# Patient Record
Sex: MALE | Race: ASIAN | NOT HISPANIC OR LATINO | Employment: FULL TIME | URBAN - METROPOLITAN AREA
[De-identification: names, ages, dates, MRNs, and addresses within clinical notes are randomized per-mention and may not be internally consistent; named-entity substitution may affect disease eponyms.]

---

## 2017-03-28 DIAGNOSIS — E11.9 TYPE 2 DIABETES MELLITUS WITHOUT COMPLICATION, WITHOUT LONG-TERM CURRENT USE OF INSULIN (HCC): ICD-10-CM

## 2017-03-28 DIAGNOSIS — E78.00 ELEVATED CHOLESTEROL: Primary | ICD-10-CM

## 2017-04-03 LAB
ALBUMIN SERPL-MCNC: 4.7 G/DL (ref 3.5–5.2)
ALBUMIN/GLOB SERPL: 1.9 G/DL
ALP SERPL-CCNC: 54 U/L (ref 39–117)
ALT SERPL-CCNC: 18 U/L (ref 1–41)
AST SERPL-CCNC: 19 U/L (ref 1–40)
BILIRUB SERPL-MCNC: 1 MG/DL (ref 0.1–1.2)
BUN SERPL-MCNC: 24 MG/DL (ref 6–20)
BUN/CREAT SERPL: 22.6 (ref 7–25)
CALCIUM SERPL-MCNC: 9.3 MG/DL (ref 8.6–10.5)
CHLORIDE SERPL-SCNC: 103 MMOL/L (ref 98–107)
CHOLEST SERPL-MCNC: 230 MG/DL (ref 0–200)
CO2 SERPL-SCNC: 28.8 MMOL/L (ref 22–29)
CREAT SERPL-MCNC: 1.06 MG/DL (ref 0.76–1.27)
GLOBULIN SER CALC-MCNC: 2.5 GM/DL
GLUCOSE SERPL-MCNC: 119 MG/DL (ref 65–99)
HBA1C MFR BLD: 6.1 % (ref 4.8–5.6)
HDLC SERPL-MCNC: 47 MG/DL (ref 40–60)
LDLC SERPL CALC-MCNC: 161 MG/DL (ref 0–100)
LDLC/HDLC SERPL: 3.43 {RATIO}
POTASSIUM SERPL-SCNC: 3.9 MMOL/L (ref 3.5–5.2)
PROT SERPL-MCNC: 7.2 G/DL (ref 6–8.5)
SODIUM SERPL-SCNC: 146 MMOL/L (ref 136–145)
TRIGL SERPL-MCNC: 109 MG/DL (ref 0–150)
VLDLC SERPL CALC-MCNC: 21.8 MG/DL (ref 5–40)

## 2017-04-10 ENCOUNTER — OFFICE VISIT (OUTPATIENT)
Dept: FAMILY MEDICINE CLINIC | Facility: CLINIC | Age: 52
End: 2017-04-10

## 2017-04-10 VITALS
RESPIRATION RATE: 16 BRPM | HEIGHT: 68 IN | SYSTOLIC BLOOD PRESSURE: 118 MMHG | HEART RATE: 57 BPM | WEIGHT: 149 LBS | BODY MASS INDEX: 22.58 KG/M2 | DIASTOLIC BLOOD PRESSURE: 68 MMHG | TEMPERATURE: 98.2 F | OXYGEN SATURATION: 99 %

## 2017-04-10 DIAGNOSIS — E78.00 ELEVATED CHOLESTEROL: Primary | ICD-10-CM

## 2017-04-10 DIAGNOSIS — E11.9 DIABETES MELLITUS TYPE 2 IN NONOBESE (HCC): ICD-10-CM

## 2017-04-10 PROCEDURE — 99214 OFFICE O/P EST MOD 30 MIN: CPT | Performed by: INTERNAL MEDICINE

## 2017-04-10 RX ORDER — ATORVASTATIN CALCIUM 20 MG/1
20 TABLET, FILM COATED ORAL DAILY
Qty: 90 TABLET | Refills: 3 | Status: SHIPPED | OUTPATIENT
Start: 2017-04-10 | End: 2017-09-28 | Stop reason: SDUPTHER

## 2017-04-10 NOTE — PROGRESS NOTES
Subjective   Stanislav Martino is a 51 y.o. male. Patient is here today for   Chief Complaint   Patient presents with   • Diabetes     lab f/u   • Hyperlipidemia          Vitals:    04/10/17 1316   BP: 118/68   Pulse: 57   Resp: 16   Temp: 98.2 °F (36.8 °C)   SpO2: 99%       Past Medical History:   Diagnosis Date   • Diabetes mellitus    • GERD (gastroesophageal reflux disease)    • Hematochezia    • Hypercholesterolemia       No Known Allergies   Social History     Social History   • Marital status:      Spouse name: N/A   • Number of children: N/A   • Years of education: N/A     Occupational History   • Not on file.     Social History Main Topics   • Smoking status: Never Smoker   • Smokeless tobacco: Not on file   • Alcohol use Yes      Comment: occasional    • Drug use: Not on file   • Sexual activity: Not on file     Other Topics Concern   • Not on file     Social History Narrative        Current Outpatient Prescriptions:   •  ACCU-CHEK SOFTCLIX LANCETS lancets, Use as instructed, Disp: 100 each, Rfl: 12  •  Blood Glucose Monitoring Suppl (ACCU-CHEK VERONICA PLUS) W/DEVICE kit, USE AS DIRECTED, Disp: 1 kit, Rfl: 0  •  fluticasone (FLONASE) 50 MCG/ACT nasal spray, into each nostril daily., Disp: , Rfl:   •  glucose blood (ACCU-CHEK VERONICA PLUS) test strip, CHECK BLOOD SUGAR ONCE DAILY, Disp: 100 each, Rfl: 2  •  Lancets (ACCU-CHEK MULTICLIX) lancets, Check glucose once daily, Disp: 102 each, Rfl: 12  •  atorvastatin (LIPITOR) 20 MG tablet, Take 1 tablet by mouth Daily., Disp: 90 tablet, Rfl: 3     Objective     HPI Comments: He is here today to follow-up on his type 2 diabetes and hypercholesterolemia.    He claims to feel well.    Diabetes     Hyperlipidemia          Review of Systems   Constitutional: Negative.    HENT: Negative.    Respiratory: Negative.    Cardiovascular: Negative.    Psychiatric/Behavioral: Negative.        Physical Exam   Constitutional: He is oriented to person, place, and time. He  appears well-developed and well-nourished.   HENT:   Head: Normocephalic and atraumatic.   Pulmonary/Chest: Effort normal.   Neurological: He is alert and oriented to person, place, and time.   Psychiatric: He has a normal mood and affect. His behavior is normal.   Nursing note and vitals reviewed.        Problem List Items Addressed This Visit        Cardiovascular and Mediastinum    Elevated cholesterol - Primary    Relevant Medications    atorvastatin (LIPITOR) 20 MG tablet       Endocrine    Diabetes mellitus type 2 in nonobese            PLAN  He has excellent control of his type 2 diabetes.  This control with diet alone.    His hypercholesterolemia.  I asked him to consider starting medication to manage his hypercholesterolemia and he agreed.  I sent out a prescription for atorvastatin 20 mg once daily.    I like him back in about 3 months to reassess a lipid profile, comprehensive metabolic panel, urine microalbumin, and hemoglobin A1c.      No Follow-up on file.

## 2017-09-28 RX ORDER — ATORVASTATIN CALCIUM 20 MG/1
20 TABLET, FILM COATED ORAL DAILY
Qty: 90 TABLET | Refills: 3 | Status: SHIPPED | OUTPATIENT
Start: 2017-09-28

## 2017-10-26 DIAGNOSIS — E11.9 DIABETES MELLITUS TYPE 2 IN NONOBESE (HCC): ICD-10-CM

## 2017-10-26 DIAGNOSIS — E78.00 ELEVATED CHOLESTEROL: Primary | ICD-10-CM

## 2017-10-28 LAB
ALBUMIN SERPL-MCNC: 4.4 G/DL (ref 3.5–5.2)
ALBUMIN/CREAT UR: 2 MG/G CREAT (ref 0–30)
ALBUMIN/GLOB SERPL: 1.6 G/DL
ALP SERPL-CCNC: 46 U/L (ref 39–117)
ALT SERPL-CCNC: 15 U/L (ref 1–41)
AST SERPL-CCNC: 20 U/L (ref 1–40)
BILIRUB SERPL-MCNC: 0.5 MG/DL (ref 0.1–1.2)
BUN SERPL-MCNC: 20 MG/DL (ref 6–20)
BUN/CREAT SERPL: 20.2 (ref 7–25)
CALCIUM SERPL-MCNC: 9.3 MG/DL (ref 8.6–10.5)
CHLORIDE SERPL-SCNC: 103 MMOL/L (ref 98–107)
CHOLEST SERPL-MCNC: 193 MG/DL (ref 0–200)
CO2 SERPL-SCNC: 28.3 MMOL/L (ref 22–29)
CREAT SERPL-MCNC: 0.99 MG/DL (ref 0.76–1.27)
CREAT UR-MCNC: 210.4 MG/DL
GFR SERPLBLD CREATININE-BSD FMLA CKD-EPI: 79 ML/MIN/1.73
GFR SERPLBLD CREATININE-BSD FMLA CKD-EPI: 96 ML/MIN/1.73
GLOBULIN SER CALC-MCNC: 2.7 GM/DL
GLUCOSE SERPL-MCNC: 118 MG/DL (ref 65–99)
HBA1C MFR BLD: 5.7 % (ref 4.8–5.6)
HDLC SERPL-MCNC: 49 MG/DL (ref 40–60)
LDLC SERPL CALC-MCNC: 127 MG/DL (ref 0–100)
LDLC/HDLC SERPL: 2.59 {RATIO}
MICROALBUMIN UR-MCNC: 4.3 UG/ML
POTASSIUM SERPL-SCNC: 3.9 MMOL/L (ref 3.5–5.2)
PROT SERPL-MCNC: 7.1 G/DL (ref 6–8.5)
SODIUM SERPL-SCNC: 143 MMOL/L (ref 136–145)
TRIGL SERPL-MCNC: 85 MG/DL (ref 0–150)
VLDLC SERPL CALC-MCNC: 17 MG/DL (ref 5–40)

## 2017-11-03 ENCOUNTER — HOSPITAL ENCOUNTER (OUTPATIENT)
Dept: GENERAL RADIOLOGY | Facility: HOSPITAL | Age: 52
Discharge: HOME | End: 2017-11-03

## 2017-11-03 ENCOUNTER — OFFICE VISIT (OUTPATIENT)
Dept: FAMILY MEDICINE CLINIC | Facility: CLINIC | Age: 52
End: 2017-11-03

## 2017-11-03 ENCOUNTER — HOSPITAL ENCOUNTER (OUTPATIENT)
Dept: GENERAL RADIOLOGY | Facility: HOSPITAL | Age: 52
Discharge: HOME | End: 2017-11-03
Admitting: INTERNAL MEDICINE

## 2017-11-03 VITALS
SYSTOLIC BLOOD PRESSURE: 116 MMHG | HEART RATE: 70 BPM | DIASTOLIC BLOOD PRESSURE: 68 MMHG | WEIGHT: 144 LBS | OXYGEN SATURATION: 98 % | BODY MASS INDEX: 21.82 KG/M2 | HEIGHT: 68 IN | RESPIRATION RATE: 16 BRPM

## 2017-11-03 DIAGNOSIS — M25.562 LEFT KNEE PAIN, UNSPECIFIED CHRONICITY: Primary | ICD-10-CM

## 2017-11-03 DIAGNOSIS — M54.5 LOW BACK PAIN, UNSPECIFIED BACK PAIN LATERALITY, UNSPECIFIED CHRONICITY, WITH SCIATICA PRESENCE UNSPECIFIED: ICD-10-CM

## 2017-11-03 DIAGNOSIS — E11.9 DIABETES MELLITUS TYPE 2 IN NONOBESE (HCC): ICD-10-CM

## 2017-11-03 DIAGNOSIS — E78.00 ELEVATED CHOLESTEROL: ICD-10-CM

## 2017-11-03 LAB
BILIRUB BLD-MCNC: NEGATIVE MG/DL
CLARITY, POC: CLEAR
COLOR UR: YELLOW
GLUCOSE UR STRIP-MCNC: NEGATIVE MG/DL
KETONES UR QL: NEGATIVE
LEUKOCYTE EST, POC: NEGATIVE
NITRITE UR-MCNC: NEGATIVE MG/ML
PH UR: 7 [PH] (ref 5–8)
PROT UR STRIP-MCNC: NEGATIVE MG/DL
RBC # UR STRIP: NEGATIVE /UL
SP GR UR: 1.01 (ref 1–1.03)
UROBILINOGEN UR QL: NORMAL

## 2017-11-03 PROCEDURE — 72110 X-RAY EXAM L-2 SPINE 4/>VWS: CPT

## 2017-11-03 PROCEDURE — 81003 URINALYSIS AUTO W/O SCOPE: CPT | Performed by: INTERNAL MEDICINE

## 2017-11-03 PROCEDURE — 73560 X-RAY EXAM OF KNEE 1 OR 2: CPT

## 2017-11-03 PROCEDURE — 99214 OFFICE O/P EST MOD 30 MIN: CPT | Performed by: INTERNAL MEDICINE

## 2017-11-05 PROBLEM — M25.562 LEFT KNEE PAIN: Status: ACTIVE | Noted: 2017-11-05

## 2017-11-05 PROBLEM — M54.50 LOW BACK PAIN: Status: ACTIVE | Noted: 2017-11-05

## 2017-11-05 NOTE — PROGRESS NOTES
Subjective   Stanislav Martino is a 52 y.o. male. Patient is here today for   Chief Complaint   Patient presents with   • Hyperlipidemia     lab f/u   • Diabetes          Vitals:    11/03/17 1257   BP: 116/68   Pulse: 70   Resp: 16   SpO2: 98%       Past Medical History:   Diagnosis Date   • Diabetes mellitus    • GERD (gastroesophageal reflux disease)    • Hematochezia    • Hypercholesterolemia       No Known Allergies   Social History     Social History   • Marital status:      Spouse name: N/A   • Number of children: N/A   • Years of education: N/A     Occupational History   • Not on file.     Social History Main Topics   • Smoking status: Never Smoker   • Smokeless tobacco: Not on file   • Alcohol use Yes      Comment: occasional    • Drug use: Not on file   • Sexual activity: Not on file     Other Topics Concern   • Not on file     Social History Narrative        Current Outpatient Prescriptions:   •  ACCU-CHEK SOFTCLIX LANCETS lancets, Use as instructed, Disp: 100 each, Rfl: 12  •  atorvastatin (LIPITOR) 20 MG tablet, Take 1 tablet by mouth Daily., Disp: 90 tablet, Rfl: 3  •  Blood Glucose Monitoring Suppl (ACCU-CHEK VERONICA PLUS) W/DEVICE kit, USE AS DIRECTED, Disp: 1 kit, Rfl: 0  •  fluticasone (FLONASE) 50 MCG/ACT nasal spray, into each nostril daily., Disp: , Rfl:   •  glucose blood (ACCU-CHEK VERONICA PLUS) test strip, CHECK BLOOD SUGAR ONCE DAILY, Disp: 100 each, Rfl: 2  •  Lancets (ACCU-CHEK MULTICLIX) lancets, Check glucose once daily, Disp: 102 each, Rfl: 12     Objective     HPI Comments: He is here to follow-up on his type 2 diabetes and his hypercholesterolemia.    He complains of lumbar spine pain and right knee pain for many months.  He exercises regularly.  He currently enjoys running.  He used to play soccer but not since he injured himself years ago.  He intends to take part in the mini marathon upcoming.  For the time being, he is accustomed to running as many as 8 miles  daily..    Hyperlipidemia     Diabetes          Review of Systems   Constitutional: Negative.    HENT: Negative.    Respiratory: Negative.    Cardiovascular: Negative.    Musculoskeletal:        Lumbar spine pain and right knee pain   Psychiatric/Behavioral: Negative.        Physical Exam   Constitutional: He is oriented to person, place, and time. He appears well-developed and well-nourished.   Pleasant, neatly groomed, appropriate weight.   HENT:   Head: Normocephalic and atraumatic.   Pulmonary/Chest: Effort normal.   Musculoskeletal:   Head no crepitus in his right knee.  There was no effusion in the right knee.     He had no pain on palpation of his lumbar spine.   Neurological: He is alert and oriented to person, place, and time.   Psychiatric: He has a normal mood and affect. His behavior is normal.   Nursing note and vitals reviewed.        Problem List Items Addressed This Visit        Cardiovascular and Mediastinum    Elevated cholesterol       Endocrine    Diabetes mellitus type 2 in nonobese       Nervous and Auditory    Low back pain    Relevant Orders    XR Spine Lumbar 4+ View (Completed)    POC Urinalysis Dipstick, Automated (Completed)       Musculoskeletal and Integument    Left knee pain - Primary    Relevant Orders    XR Knee 1 or 2 View Left (Completed)            PLAN  He has excellent control of his type 2 diabetes.    His hypercholesterolemia is well-controlled.    He has chronic lumbar spine pain and right knee pain.  I'm going to get an x-ray of both these areas.  He may take an anti-inflammatory over-the-counter as needed.    He is relatively physically fit.  I don't think that physical therapy is going to have much to offer him in the way of pain relief.    It may be prudent to refer him to orthopedic surgery for further evaluation of his lumbar spine and right knee pain.  I will wait for the results of his x-rays.  No Follow-up on file.

## 2017-11-30 ENCOUNTER — TELEPHONE (OUTPATIENT)
Dept: FAMILY MEDICINE CLINIC | Facility: CLINIC | Age: 52
End: 2017-11-30

## 2017-11-30 RX ORDER — BLOOD-GLUCOSE METER
EACH MISCELLANEOUS
Qty: 1 KIT | Refills: 0 | Status: SHIPPED | OUTPATIENT
Start: 2017-11-30 | End: 2019-05-16 | Stop reason: SDUPTHER

## 2017-11-30 RX ORDER — MELOXICAM 15 MG/1
15 TABLET ORAL DAILY
Qty: 30 TABLET | Refills: 1 | Status: SHIPPED | OUTPATIENT
Start: 2017-11-30 | End: 2018-02-12

## 2017-11-30 NOTE — TELEPHONE ENCOUNTER
Spoke to patient and informed him that the prescription and the meter were sent to his pharmacy   ----- Message from Riri Bae sent at 11/30/2017 12:14 PM EST -----  PT CAME IN TODAY AND WANTED TO SEE IF HE COULD GET A NEW METER CALLED IN FOR HIM, THE ONE HE HAS IS OVER 2 YEARS OLD. (ACCU-CHECK)    REFILL ON MELOXICAM 15MG    TO [facility]    PT# [phone]

## 2018-02-12 ENCOUNTER — OFFICE VISIT (OUTPATIENT)
Dept: FAMILY MEDICINE CLINIC | Facility: CLINIC | Age: 53
End: 2018-02-12

## 2018-02-12 VITALS
DIASTOLIC BLOOD PRESSURE: 64 MMHG | RESPIRATION RATE: 18 BRPM | HEART RATE: 51 BPM | HEIGHT: 68 IN | SYSTOLIC BLOOD PRESSURE: 122 MMHG | BODY MASS INDEX: 22.43 KG/M2 | TEMPERATURE: 97.8 F | OXYGEN SATURATION: 97 % | WEIGHT: 148 LBS

## 2018-02-12 DIAGNOSIS — M54.5 LOW BACK PAIN, UNSPECIFIED BACK PAIN LATERALITY, UNSPECIFIED CHRONICITY, WITH SCIATICA PRESENCE UNSPECIFIED: ICD-10-CM

## 2018-02-12 DIAGNOSIS — R10.13 EPIGASTRIC PAIN: Primary | ICD-10-CM

## 2018-02-12 LAB
BILIRUB BLD-MCNC: NEGATIVE MG/DL
CLARITY, POC: CLEAR
COLOR UR: YELLOW
GLUCOSE UR STRIP-MCNC: NEGATIVE MG/DL
KETONES UR QL: NEGATIVE
LEUKOCYTE EST, POC: NEGATIVE
NITRITE UR-MCNC: NEGATIVE MG/ML
PH UR: 5.5 [PH] (ref 5–8)
PROT UR STRIP-MCNC: NEGATIVE MG/DL
RBC # UR STRIP: ABNORMAL /UL
SP GR UR: 1 (ref 1–1.03)
UROBILINOGEN UR QL: NORMAL

## 2018-02-12 PROCEDURE — 99213 OFFICE O/P EST LOW 20 MIN: CPT | Performed by: INTERNAL MEDICINE

## 2018-02-12 PROCEDURE — 81003 URINALYSIS AUTO W/O SCOPE: CPT | Performed by: INTERNAL MEDICINE

## 2018-02-12 RX ORDER — PANTOPRAZOLE SODIUM 40 MG/1
40 TABLET, DELAYED RELEASE ORAL DAILY
Qty: 30 TABLET | Refills: 0 | Status: SHIPPED | OUTPATIENT
Start: 2018-02-12 | End: 2018-09-13 | Stop reason: SDUPTHER

## 2018-02-12 NOTE — PROGRESS NOTES
Subjective   Stanislav Martino is a 52 y.o. male. Patient is here today for   Chief Complaint   Patient presents with   • Back Pain     and Burning x 4 weeks    • Abdominal Pain     RLQ pain x 2 weeks          Vitals:    02/12/18 1555   BP: 122/64   Pulse: 51   Resp: 18   Temp: 97.8 °F (36.6 °C)   SpO2: 97%     The following portions of the patient's history were reviewed and updated as appropriate: allergies, current medications, past family history, past medical history, past social history, past surgical history and problem list.    Past Medical History:   Diagnosis Date   • Diabetes mellitus    • GERD (gastroesophageal reflux disease)    • Hematochezia    • Hypercholesterolemia       No Known Allergies   Social History     Social History   • Marital status:      Spouse name: N/A   • Number of children: N/A   • Years of education: N/A     Occupational History   • Not on file.     Social History Main Topics   • Smoking status: Never Smoker   • Smokeless tobacco: Not on file   • Alcohol use Yes      Comment: occasional    • Drug use: Not on file   • Sexual activity: Not on file     Other Topics Concern   • Not on file     Social History Narrative        Current Outpatient Prescriptions:   •  atorvastatin (LIPITOR) 20 MG tablet, Take 1 tablet by mouth Daily., Disp: 90 tablet, Rfl: 3  •  Blood Glucose Monitoring Suppl (ACCU-CHEK VERONICA PLUS) w/Device kit, Use daily as instructed for type 2 diabetes E11.9, Disp: 1 kit, Rfl: 0  •  fluticasone (FLONASE) 50 MCG/ACT nasal spray, into each nostril daily., Disp: , Rfl:   •  glucose blood (ACCU-CHEK VERONICA PLUS) test strip, CHECK BLOOD SUGAR ONCE DAILY, Disp: 100 each, Rfl: 3  •  Lancets (ACCU-CHEK MULTICLIX) lancets, Check glucose once daily, Disp: 102 each, Rfl: 12  •  ACCU-CHEK SOFTCLIX LANCETS lancets, Use as instructed, Disp: 100 each, Rfl: 12  •  pantoprazole (PROTONIX) 40 MG EC tablet, Take 1 tablet by mouth Daily., Disp: 30 tablet, Rfl: 0     Objective     History  of Present Illness Stanislav complains of upper abdominal pain that started about a week to week and a half ago.  He describes the pain as burning.  He has not taken anything for the pain.  The pain does wake him up at night.  He denies any nausea, vomiting, change in his bowel movements, or melena.  He does not take any nonsteroidal anti-inflammatory agents.  He also complains of bilateral lower back pain that is intermittent.  He denies any urinary symptoms.    Review of Systems   Gastrointestinal: Positive for abdominal pain. Negative for blood in stool, diarrhea, nausea and vomiting.   Genitourinary: Negative.    Psychiatric/Behavioral: Negative.        Physical Exam   Constitutional: He appears well-developed and well-nourished.   Cardiovascular: Normal rate, regular rhythm and normal heart sounds.    Pulmonary/Chest: Effort normal and breath sounds normal.   Abdominal: Soft. Bowel sounds are normal. He exhibits no distension and no mass. There is no tenderness.   Vitals reviewed.      ASSESSMENT     Problem List Items Addressed This Visit        Nervous and Auditory    Low back pain    Epigastric pain - Primary    Relevant Orders    POC Urinalysis Dipstick, Automated (Completed)          PLAN  Patient Instructions   Items are most consistent with gastritis or possibly an ulcer.  Start pantoprazole 40 mg one half-hour before breakfast.  He can also take an antacid as needed.  If symptoms persist will do some lab work and obtain an upper GI.    Return if symptoms worsen or fail to improve.

## 2018-02-13 NOTE — PATIENT INSTRUCTIONS
Items are most consistent with gastritis or possibly an ulcer.  Start pantoprazole 40 mg one half-hour before breakfast.  He can also take an antacid as needed.  If symptoms persist will do some lab work and obtain an upper GI.

## 2018-09-13 RX ORDER — PANTOPRAZOLE SODIUM 40 MG/1
40 TABLET, DELAYED RELEASE ORAL DAILY
Qty: 30 TABLET | Refills: 0 | Status: SHIPPED | OUTPATIENT
Start: 2018-09-13 | End: 2019-09-12 | Stop reason: SDUPTHER

## 2018-10-12 DIAGNOSIS — E11.9 DIABETES MELLITUS TYPE 2 IN NONOBESE (HCC): ICD-10-CM

## 2018-10-12 DIAGNOSIS — Z11.59 NEED FOR HEPATITIS C SCREENING TEST: ICD-10-CM

## 2018-10-12 DIAGNOSIS — Z12.5 SCREENING PSA (PROSTATE SPECIFIC ANTIGEN): ICD-10-CM

## 2018-10-12 DIAGNOSIS — E78.00 ELEVATED CHOLESTEROL: Primary | ICD-10-CM

## 2018-10-26 LAB
ALBUMIN SERPL-MCNC: 4.5 G/DL (ref 3.5–5.2)
ALBUMIN/GLOB SERPL: 1.7 G/DL
ALP SERPL-CCNC: 50 U/L (ref 39–117)
ALT SERPL-CCNC: 23 U/L (ref 1–41)
APPEARANCE UR: CLEAR
AST SERPL-CCNC: 24 U/L (ref 1–40)
BACTERIA #/AREA URNS HPF: NORMAL /HPF
BASOPHILS # BLD AUTO: 0.02 10*3/MM3 (ref 0–0.2)
BASOPHILS NFR BLD AUTO: 0.5 % (ref 0–1.5)
BILIRUB SERPL-MCNC: 0.6 MG/DL (ref 0.1–1.2)
BILIRUB UR QL STRIP: NEGATIVE
BUN SERPL-MCNC: 25 MG/DL (ref 6–20)
BUN/CREAT SERPL: 25.3 (ref 7–25)
CALCIUM SERPL-MCNC: 9.6 MG/DL (ref 8.6–10.5)
CASTS URNS MICRO: NORMAL
CHLORIDE SERPL-SCNC: 101 MMOL/L (ref 98–107)
CHOLEST SERPL-MCNC: 141 MG/DL (ref 0–200)
CO2 SERPL-SCNC: 28.3 MMOL/L (ref 22–29)
COLOR UR: YELLOW
CREAT SERPL-MCNC: 0.99 MG/DL (ref 0.76–1.27)
EOSINOPHIL # BLD AUTO: 0.08 10*3/MM3 (ref 0–0.7)
EOSINOPHIL NFR BLD AUTO: 1.9 % (ref 0.3–6.2)
EPI CELLS #/AREA URNS HPF: NORMAL /HPF
ERYTHROCYTE [DISTWIDTH] IN BLOOD BY AUTOMATED COUNT: 12.7 % (ref 11.5–14.5)
GLOBULIN SER CALC-MCNC: 2.6 GM/DL
GLUCOSE SERPL-MCNC: 131 MG/DL (ref 65–99)
GLUCOSE UR QL: NEGATIVE
HBA1C MFR BLD: 5.73 % (ref 4.8–5.6)
HCT VFR BLD AUTO: 40 % (ref 40.4–52.2)
HCV AB S/CO SERPL IA: <0.1 S/CO RATIO (ref 0–0.9)
HCV AB SERPL QL IA: NORMAL
HDLC SERPL-MCNC: 42 MG/DL (ref 40–60)
HGB BLD-MCNC: 13.1 G/DL (ref 13.7–17.6)
HGB UR QL STRIP: NEGATIVE
IMM GRANULOCYTES # BLD: 0 10*3/MM3 (ref 0–0.03)
IMM GRANULOCYTES NFR BLD: 0 % (ref 0–0.5)
KETONES UR QL STRIP: NEGATIVE
LDLC SERPL CALC-MCNC: 72 MG/DL (ref 0–100)
LDLC/HDLC SERPL: 1.71 {RATIO}
LEUKOCYTE ESTERASE UR QL STRIP: NEGATIVE
LYMPHOCYTES # BLD AUTO: 1.46 10*3/MM3 (ref 0.9–4.8)
LYMPHOCYTES NFR BLD AUTO: 35.5 % (ref 19.6–45.3)
MCH RBC QN AUTO: 29 PG (ref 27–32.7)
MCHC RBC AUTO-ENTMCNC: 32.8 G/DL (ref 32.6–36.4)
MCV RBC AUTO: 88.7 FL (ref 79.8–96.2)
MONOCYTES # BLD AUTO: 0.26 10*3/MM3 (ref 0.2–1.2)
MONOCYTES NFR BLD AUTO: 6.3 % (ref 5–12)
NEUTROPHILS # BLD AUTO: 2.29 10*3/MM3 (ref 1.9–8.1)
NEUTROPHILS NFR BLD AUTO: 55.8 % (ref 42.7–76)
NITRITE UR QL STRIP: NEGATIVE
PH UR STRIP: 6 [PH] (ref 5–8)
PLATELET # BLD AUTO: 148 10*3/MM3 (ref 140–500)
POTASSIUM SERPL-SCNC: 3.9 MMOL/L (ref 3.5–5.2)
PROT SERPL-MCNC: 7.1 G/DL (ref 6–8.5)
PROT UR QL STRIP: NEGATIVE
PSA SERPL-MCNC: 0.92 NG/ML (ref 0–4)
RBC # BLD AUTO: 4.51 10*6/MM3 (ref 4.6–6)
RBC #/AREA URNS HPF: NORMAL /HPF
SODIUM SERPL-SCNC: 141 MMOL/L (ref 136–145)
SP GR UR: 1.02 (ref 1–1.03)
TRIGL SERPL-MCNC: 136 MG/DL (ref 0–150)
UROBILINOGEN UR STRIP-MCNC: (no result) MG/DL
VLDLC SERPL CALC-MCNC: 27.2 MG/DL (ref 5–40)
WBC # BLD AUTO: 4.11 10*3/MM3 (ref 4.5–10.7)
WBC #/AREA URNS HPF: NORMAL /HPF

## 2018-11-01 ENCOUNTER — OFFICE VISIT (OUTPATIENT)
Dept: FAMILY MEDICINE CLINIC | Facility: CLINIC | Age: 53
End: 2018-11-01

## 2018-11-01 VITALS
BODY MASS INDEX: 21.92 KG/M2 | SYSTOLIC BLOOD PRESSURE: 102 MMHG | DIASTOLIC BLOOD PRESSURE: 62 MMHG | HEART RATE: 66 BPM | HEIGHT: 68 IN | TEMPERATURE: 98.2 F | RESPIRATION RATE: 16 BRPM | WEIGHT: 144.6 LBS | OXYGEN SATURATION: 99 %

## 2018-11-01 DIAGNOSIS — E78.00 HYPERCHOLESTEROLEMIA: Primary | ICD-10-CM

## 2018-11-01 DIAGNOSIS — E11.9 DIABETES MELLITUS TYPE 2 IN NONOBESE (HCC): ICD-10-CM

## 2018-11-01 PROCEDURE — 99213 OFFICE O/P EST LOW 20 MIN: CPT | Performed by: INTERNAL MEDICINE

## 2018-11-04 NOTE — PROGRESS NOTES
Subjective   Stanislav Martino is a 53 y.o. male. Patient is here today for   Chief Complaint   Patient presents with   • Follow-up     ELEVATED CHOLESTEROL           Vitals:    11/01/18 1509   BP: 102/62   Pulse: 66   Resp: 16   Temp: 98.2 °F (36.8 °C)   SpO2: 99%       Past Medical History:   Diagnosis Date   • Diabetes mellitus (CMS/HCC)    • GERD (gastroesophageal reflux disease)    • Hematochezia    • Hypercholesterolemia       No Known Allergies   Social History     Social History   • Marital status:      Spouse name: N/A   • Number of children: N/A   • Years of education: N/A     Occupational History   • Not on file.     Social History Main Topics   • Smoking status: Never Smoker   • Smokeless tobacco: Never Used   • Alcohol use Yes      Comment: occasional    • Drug use: Unknown   • Sexual activity: Not on file     Other Topics Concern   • Not on file     Social History Narrative   • No narrative on file        Current Outpatient Prescriptions:   •  atorvastatin (LIPITOR) 20 MG tablet, Take 1 tablet by mouth Daily., Disp: 90 tablet, Rfl: 3  •  Blood Glucose Monitoring Suppl (ACCU-CHEK VERONICA PLUS) w/Device kit, Use daily as instructed for type 2 diabetes E11.9, Disp: 1 kit, Rfl: 0  •  fluticasone (FLONASE) 50 MCG/ACT nasal spray, into each nostril daily., Disp: , Rfl:   •  glucose blood (ACCU-CHEK VERONICA PLUS) test strip, CHECK BLOOD SUGAR ONCE DAILY, Disp: 100 each, Rfl: 3  •  Lancets (ACCU-CHEK MULTICLIX) lancets, Check glucose once daily, Disp: 102 each, Rfl: 12  •  pantoprazole (PROTONIX) 40 MG EC tablet, TAKE 1 TABLET BY MOUTH DAILY, Disp: 30 tablet, Rfl: 0  •  ACCU-CHEK SOFTCLIX LANCETS lancets, Use as instructed, Disp: 100 each, Rfl: 12     Objective     He is here today to follow-up with hypercholesterolemia and type 2 diabetes.    He tells me he feels well.  Next    He would like to make an appointment for preventative physical.             Review of Systems   Constitutional: Negative.    HENT:  Negative.    Respiratory: Negative.    Cardiovascular: Negative.    Musculoskeletal: Negative.    Psychiatric/Behavioral: Negative.        Physical Exam   Constitutional: He is oriented to person, place, and time. He appears well-developed and well-nourished.   HENT:   Head: Atraumatic.   Pulmonary/Chest: Effort normal.   Neurological: He is alert and oriented to person, place, and time.   Psychiatric: He has a normal mood and affect. His behavior is normal.   Nursing note and vitals reviewed.        Problem List Items Addressed This Visit        Cardiovascular and Mediastinum    Hypercholesterolemia - Primary       Endocrine    Diabetes mellitus type 2 in nonobese (CMS/HCC)            PLAN  His hypercholesterolemia is well-controlled.  Next    He has excellent control his type 2 diabetes.    I asked him to make an appointment for a yearly comprehensive physical exam/preventative exam at his convenience.    I like to see him back in 6 months or so.  No Follow-up on file.

## 2018-12-14 ENCOUNTER — OFFICE VISIT (OUTPATIENT)
Dept: FAMILY MEDICINE CLINIC | Facility: CLINIC | Age: 53
End: 2018-12-14

## 2018-12-14 VITALS
OXYGEN SATURATION: 98 % | RESPIRATION RATE: 18 BRPM | HEIGHT: 68 IN | DIASTOLIC BLOOD PRESSURE: 72 MMHG | SYSTOLIC BLOOD PRESSURE: 110 MMHG | HEART RATE: 66 BPM | WEIGHT: 146 LBS | BODY MASS INDEX: 22.13 KG/M2

## 2018-12-14 DIAGNOSIS — R30.0 DYSURIA: ICD-10-CM

## 2018-12-14 DIAGNOSIS — R10.13 EPIGASTRIC PAIN: Primary | ICD-10-CM

## 2018-12-14 LAB
BILIRUB BLD-MCNC: NEGATIVE MG/DL
GLUCOSE UR STRIP-MCNC: NEGATIVE MG/DL
KETONES UR QL: NEGATIVE
LEUKOCYTE EST, POC: NEGATIVE
NITRITE UR-MCNC: NEGATIVE MG/ML
PH UR: 6.5 [PH] (ref 5–8)
PROT UR STRIP-MCNC: NEGATIVE MG/DL
RBC # UR STRIP: NEGATIVE /UL
SP GR UR: 1.02 (ref 1–1.03)
UROBILINOGEN UR QL: NORMAL

## 2018-12-14 PROCEDURE — 81003 URINALYSIS AUTO W/O SCOPE: CPT | Performed by: INTERNAL MEDICINE

## 2018-12-14 PROCEDURE — 99214 OFFICE O/P EST MOD 30 MIN: CPT | Performed by: INTERNAL MEDICINE

## 2018-12-14 NOTE — PATIENT INSTRUCTIONS
Neck examination reveals normal submandibular glands and no abnormal lymph nodes.  Urinalysis today is normal.  Discussed referring to gastroenterology to be tested for H. pylori

## 2019-02-01 ENCOUNTER — OFFICE VISIT (OUTPATIENT)
Dept: FAMILY MEDICINE CLINIC | Facility: CLINIC | Age: 54
End: 2019-02-01

## 2019-02-01 VITALS
TEMPERATURE: 100.4 F | OXYGEN SATURATION: 99 % | WEIGHT: 141.2 LBS | SYSTOLIC BLOOD PRESSURE: 132 MMHG | RESPIRATION RATE: 16 BRPM | BODY MASS INDEX: 21.4 KG/M2 | HEART RATE: 81 BPM | DIASTOLIC BLOOD PRESSURE: 72 MMHG | HEIGHT: 68 IN

## 2019-02-01 DIAGNOSIS — R50.9 FEVER, UNSPECIFIED FEVER CAUSE: ICD-10-CM

## 2019-02-01 DIAGNOSIS — R52 GENERALIZED BODY ACHES: Primary | ICD-10-CM

## 2019-02-01 DIAGNOSIS — J10.1 INFLUENZA A: ICD-10-CM

## 2019-02-01 LAB
EXPIRATION DATE: ABNORMAL
FLUAV AG NPH QL: POSITIVE
FLUBV AG NPH QL: NEGATIVE
INTERNAL CONTROL: ABNORMAL
Lab: ABNORMAL

## 2019-02-01 PROCEDURE — 87804 INFLUENZA ASSAY W/OPTIC: CPT | Performed by: INTERNAL MEDICINE

## 2019-02-01 PROCEDURE — 99213 OFFICE O/P EST LOW 20 MIN: CPT | Performed by: INTERNAL MEDICINE

## 2019-02-01 RX ORDER — OSELTAMIVIR PHOSPHATE 75 MG/1
75 CAPSULE ORAL 2 TIMES DAILY
Qty: 10 CAPSULE | Refills: 0 | Status: SHIPPED | OUTPATIENT
Start: 2019-02-01 | End: 2019-05-16

## 2019-02-01 RX ORDER — OSELTAMIVIR PHOSPHATE 75 MG/1
75 CAPSULE ORAL 2 TIMES DAILY
Qty: 10 CAPSULE | Refills: 0 | Status: SHIPPED | OUTPATIENT
Start: 2019-02-01 | End: 2019-02-01

## 2019-02-01 RX ORDER — GUAIFENESIN AND CODEINE PHOSPHATE 100; 10 MG/5ML; MG/5ML
5 SOLUTION ORAL 4 TIMES DAILY PRN
Qty: 180 ML | Refills: 0 | Status: SHIPPED | OUTPATIENT
Start: 2019-02-01 | End: 2019-05-16

## 2019-02-01 RX ORDER — ONDANSETRON 8 MG/1
8 TABLET, ORALLY DISINTEGRATING ORAL EVERY 8 HOURS PRN
Qty: 30 TABLET | Refills: 1 | Status: SHIPPED | OUTPATIENT
Start: 2019-02-01 | End: 2019-05-16

## 2019-02-03 PROBLEM — J10.1 INFLUENZA A: Status: ACTIVE | Noted: 2019-02-03

## 2019-02-03 NOTE — PROGRESS NOTES
Subjective   Stanislav Martino is a 53 y.o. male. Patient is here today for   Chief Complaint   Patient presents with   • Fever     PT STATE HAS BEEN GOING ON SINCE wednesday    • Cough   • Generalized Body Aches   • Nausea          Vitals:    02/01/19 1350   BP: 132/72   Pulse: 81   Resp: 16   Temp: 100.4 °F (38 °C)   SpO2: 99%       Past Medical History:   Diagnosis Date   • Diabetes mellitus (CMS/HCC)    • GERD (gastroesophageal reflux disease)    • Hematochezia    • Hypercholesterolemia       No Known Allergies   Social History     Socioeconomic History   • Marital status:      Spouse name: Not on file   • Number of children: Not on file   • Years of education: Not on file   • Highest education level: Not on file   Social Needs   • Financial resource strain: Not on file   • Food insecurity - worry: Not on file   • Food insecurity - inability: Not on file   • Transportation needs - medical: Not on file   • Transportation needs - non-medical: Not on file   Occupational History   • Not on file   Tobacco Use   • Smoking status: Never Smoker   • Smokeless tobacco: Never Used   Substance and Sexual Activity   • Alcohol use: Yes     Comment: occasional    • Drug use: Not on file   • Sexual activity: Not on file   Other Topics Concern   • Not on file   Social History Narrative   • Not on file        Current Outpatient Medications:   •  atorvastatin (LIPITOR) 20 MG tablet, Take 1 tablet by mouth Daily., Disp: 90 tablet, Rfl: 3  •  Blood Glucose Monitoring Suppl (ACCU-CHEK VERONICA PLUS) w/Device kit, Use daily as instructed for type 2 diabetes E11.9, Disp: 1 kit, Rfl: 0  •  fluticasone (FLONASE) 50 MCG/ACT nasal spray, into each nostril daily., Disp: , Rfl:   •  glucose blood (ACCU-CHEK VERONICA PLUS) test strip, CHECK BLOOD SUGAR ONE TIME DAILY, Disp: 100 each, Rfl: 3  •  Lancets (ACCU-CHEK MULTICLIX) lancets, Check glucose once daily, Disp: 102 each, Rfl: 12  •  pantoprazole (PROTONIX) 40 MG EC tablet, TAKE 1 TABLET BY  MOUTH DAILY, Disp: 30 tablet, Rfl: 0  •  ACCU-CHEK SOFTCLIX LANCETS lancets, Use as instructed, Disp: 100 each, Rfl: 12  •  guaifenesin-codeine (GUAIFENESIN AC) 100-10 MG/5ML liquid, Take 5 mL by mouth 4 (Four) Times a Day As Needed for Cough., Disp: 180 mL, Rfl: 0  •  ondansetron ODT (ZOFRAN-ODT) 8 MG disintegrating tablet, Take 1 tablet by mouth Every 8 (Eight) Hours As Needed for Nausea or Vomiting., Disp: 30 tablet, Rfl: 1  •  oseltamivir (TAMIFLU) 75 MG capsule, Take 1 capsule by mouth 2 (Two) Times a Day., Disp: 10 capsule, Rfl: 0     Objective     He has had fevers chills and a cough last couple days.  He also has generalized body aches.         Review of Systems   Constitutional: Negative for chills and fever.   Respiratory: Positive for cough. Negative for shortness of breath.    Musculoskeletal: Positive for myalgias.       Physical Exam   Constitutional: He is oriented to person, place, and time. He appears well-developed and well-nourished.   Pleasant, neatly groomed, he looks like he feels ill.   HENT:   Head: Normocephalic and atraumatic.   Cardiovascular: Normal rate and regular rhythm.   Pulmonary/Chest: Effort normal and breath sounds normal.   Neurological: He is alert and oriented to person, place, and time.   Nursing note and vitals reviewed.        Problem List Items Addressed This Visit        Respiratory    Influenza A    Relevant Medications    oseltamivir (TAMIFLU) 75 MG capsule      Other Visit Diagnoses     Generalized body aches    -  Primary    Relevant Orders    POC Influenza A / B (Completed)    Fever, unspecified fever cause        Relevant Orders    POC Influenza A / B (Completed)            PLAN  I gave him a prescription for a cough medication to use when necessary.    I told him to drink plenty of fluids.  He should stay in bed and avoid people as long as he has a fever.  No Follow-up on file.    This patient influenza A screen was positive.

## 2019-04-26 DIAGNOSIS — E11.9 DIABETES MELLITUS TYPE 2 IN NONOBESE (HCC): ICD-10-CM

## 2019-04-26 DIAGNOSIS — Z12.5 SCREENING PSA (PROSTATE SPECIFIC ANTIGEN): ICD-10-CM

## 2019-04-26 DIAGNOSIS — Z00.00 ROUTINE HEALTH MAINTENANCE: Primary | ICD-10-CM

## 2019-05-07 ENCOUNTER — CLINICAL SUPPORT (OUTPATIENT)
Dept: FAMILY MEDICINE CLINIC | Facility: CLINIC | Age: 54
End: 2019-05-07

## 2019-05-07 DIAGNOSIS — Z00.00 ROUTINE HEALTH MAINTENANCE: Primary | ICD-10-CM

## 2019-05-07 PROCEDURE — 93000 ELECTROCARDIOGRAM COMPLETE: CPT | Performed by: INTERNAL MEDICINE

## 2019-05-07 PROCEDURE — 71046 X-RAY EXAM CHEST 2 VIEWS: CPT | Performed by: INTERNAL MEDICINE

## 2019-05-08 ENCOUNTER — CLINICAL SUPPORT (OUTPATIENT)
Dept: FAMILY MEDICINE CLINIC | Facility: CLINIC | Age: 54
End: 2019-05-08

## 2019-05-08 DIAGNOSIS — Z00.00 ROUTINE HEALTH MAINTENANCE: Primary | ICD-10-CM

## 2019-05-08 PROCEDURE — 85025 COMPLETE CBC W/AUTO DIFF WBC: CPT | Performed by: INTERNAL MEDICINE

## 2019-05-09 LAB
ALBUMIN SERPL-MCNC: 4.3 G/DL (ref 3.5–5.2)
ALBUMIN/CREAT UR: <2.4 MG/G CREAT (ref 0–30)
ALBUMIN/GLOB SERPL: 1.5 G/DL
ALP SERPL-CCNC: 48 U/L (ref 39–117)
ALT SERPL-CCNC: 21 U/L (ref 1–41)
APPEARANCE UR: CLEAR
AST SERPL-CCNC: 17 U/L (ref 1–40)
BILIRUB SERPL-MCNC: 0.6 MG/DL (ref 0.2–1.2)
BILIRUB UR QL STRIP: NEGATIVE
BUN SERPL-MCNC: 21 MG/DL (ref 6–20)
BUN/CREAT SERPL: 22.8 (ref 7–25)
CALCIUM SERPL-MCNC: 9.6 MG/DL (ref 8.6–10.5)
CHLORIDE SERPL-SCNC: 104 MMOL/L (ref 98–107)
CHOLEST SERPL-MCNC: 190 MG/DL (ref 0–200)
CO2 SERPL-SCNC: 29 MMOL/L (ref 22–29)
COLOR UR: YELLOW
CREAT SERPL-MCNC: 0.92 MG/DL (ref 0.76–1.27)
CREAT UR-MCNC: 124.9 MG/DL
GLOBULIN SER CALC-MCNC: 2.9 GM/DL
GLUCOSE SERPL-MCNC: 140 MG/DL (ref 65–99)
GLUCOSE UR QL: NEGATIVE
HBA1C MFR BLD: 6.3 % (ref 4.8–5.6)
HDLC SERPL-MCNC: 50 MG/DL (ref 40–60)
HGB UR QL STRIP: NEGATIVE
KETONES UR QL STRIP: NEGATIVE
LDLC SERPL CALC-MCNC: 121 MG/DL (ref 0–100)
LDLC/HDLC SERPL: 2.43 {RATIO}
LEUKOCYTE ESTERASE UR QL STRIP: NEGATIVE
MICROALBUMIN UR-MCNC: <3 UG/ML
NITRITE UR QL STRIP: NEGATIVE
PH UR STRIP: 5.5 [PH] (ref 5–8)
POTASSIUM SERPL-SCNC: 4.1 MMOL/L (ref 3.5–5.2)
PROT SERPL-MCNC: 7.2 G/DL (ref 6–8.5)
PROT UR QL STRIP: NEGATIVE
PSA SERPL-MCNC: 1.03 NG/ML (ref 0–4)
SODIUM SERPL-SCNC: 142 MMOL/L (ref 136–145)
SP GR UR: 1.02 (ref 1–1.03)
TRIGL SERPL-MCNC: 93 MG/DL (ref 0–150)
UROBILINOGEN UR STRIP-MCNC: NORMAL MG/DL
VLDLC SERPL CALC-MCNC: 18.6 MG/DL

## 2019-05-16 ENCOUNTER — OFFICE VISIT (OUTPATIENT)
Dept: FAMILY MEDICINE CLINIC | Facility: CLINIC | Age: 54
End: 2019-05-16

## 2019-05-16 VITALS
SYSTOLIC BLOOD PRESSURE: 106 MMHG | OXYGEN SATURATION: 98 % | TEMPERATURE: 98 F | HEART RATE: 62 BPM | DIASTOLIC BLOOD PRESSURE: 64 MMHG | RESPIRATION RATE: 16 BRPM | BODY MASS INDEX: 22.37 KG/M2 | WEIGHT: 147.6 LBS | HEIGHT: 68 IN

## 2019-05-16 DIAGNOSIS — Z00.00 WELL ADULT EXAM: ICD-10-CM

## 2019-05-16 DIAGNOSIS — Z12.11 SCREEN FOR COLON CANCER: Primary | ICD-10-CM

## 2019-05-16 DIAGNOSIS — M25.562 LEFT KNEE PAIN, UNSPECIFIED CHRONICITY: ICD-10-CM

## 2019-05-16 DIAGNOSIS — Z00.00 ANNUAL PHYSICAL EXAM: ICD-10-CM

## 2019-05-16 DIAGNOSIS — E78.00 HYPERCHOLESTEROLEMIA: ICD-10-CM

## 2019-05-16 DIAGNOSIS — Z12.12 SCREENING FOR RECTAL CANCER: ICD-10-CM

## 2019-05-16 DIAGNOSIS — E11.9 DIABETES MELLITUS TYPE 2 IN NONOBESE (HCC): ICD-10-CM

## 2019-05-16 PROCEDURE — 73560 X-RAY EXAM OF KNEE 1 OR 2: CPT | Performed by: INTERNAL MEDICINE

## 2019-05-16 PROCEDURE — 71046 X-RAY EXAM CHEST 2 VIEWS: CPT | Performed by: INTERNAL MEDICINE

## 2019-05-16 PROCEDURE — 99396 PREV VISIT EST AGE 40-64: CPT | Performed by: INTERNAL MEDICINE

## 2019-05-16 RX ORDER — BLOOD-GLUCOSE METER
EACH MISCELLANEOUS
Qty: 1 KIT | Refills: 0 | Status: SHIPPED | OUTPATIENT
Start: 2019-05-16 | End: 2019-09-12 | Stop reason: SDUPTHER

## 2019-05-16 NOTE — PROGRESS NOTES
Subjective   Stanislav Martino is a 53 y.o. male. Patient is here today for   Chief Complaint   Patient presents with   • Annual Exam     PT HERE FOR CPE          Vitals:    05/16/19 1446   BP: 106/64   Pulse: 62   Resp: 16   Temp: 98 °F (36.7 °C)   SpO2: 98%       The following portions of the patient's history were reviewed and updated as appropriate: allergies, current medications, past family history, past medical history, past social history, past surgical history and problem list.    Past Medical History:   Diagnosis Date   • Diabetes mellitus (CMS/HCC)    • GERD (gastroesophageal reflux disease)    • Hematochezia    • Hypercholesterolemia       No Known Allergies   Social History     Socioeconomic History   • Marital status:      Spouse name: Not on file   • Number of children: Not on file   • Years of education: Not on file   • Highest education level: Not on file   Tobacco Use   • Smoking status: Never Smoker   • Smokeless tobacco: Never Used   Substance and Sexual Activity   • Alcohol use: Yes     Comment: occasional         Current Outpatient Medications:   •  atorvastatin (LIPITOR) 20 MG tablet, Take 1 tablet by mouth Daily., Disp: 90 tablet, Rfl: 3  •  Blood Glucose Monitoring Suppl (ACCU-CHEK VERONICA PLUS) w/Device kit, Use daily as instructed for type 2 diabetes E11.9, Disp: 1 kit, Rfl: 0  •  fluticasone (FLONASE) 50 MCG/ACT nasal spray, into each nostril daily., Disp: , Rfl:   •  glucose blood (ACCU-CHEK VERONICA PLUS) test strip, CHECK BLOOD SUGAR ONE TIME DAILY, Disp: 100 each, Rfl: 3  •  Lancets (ACCU-CHEK MULTICLIX) lancets, Check glucose once daily, Disp: 102 each, Rfl: 12  •  pantoprazole (PROTONIX) 40 MG EC tablet, TAKE 1 TABLET BY MOUTH DAILY, Disp: 30 tablet, Rfl: 0  •  ACCU-CHEK SOFTCLIX LANCETS lancets, Use as instructed, Disp: 100 each, Rfl: 12     EKG  ECG ReportOhElectrocardiogram showed normal sinus rhythm with a regular rate.    PA and lateral chest x-ray show no acute or chronic  changes.  This is a normal chest x-ray.  Objective    Here today for a comprehensive physical exam.  He complains only of occasional pain in his left knee from time to time.  He runs about 5 to 10 miles a week routinely.  Does not note that he has swelling in either knee.    Otherwise, he feels well.       Stanislav Martino 53 y.o. male who presents for an Annual Wellness Visit.  he has a history of   Patient Active Problem List   Diagnosis   • Hypercholesterolemia   • Diabetes mellitus type 2 in nonobese (CMS/[facility])   • Left knee pain   • Low back pain   • Epigastric pain   • Dysuria   • Influenza A   • Well adult exam   .  he has been doing well with new interval problems.  Labs results discussed in detail with the patient.  Plan to update vaccines if needed today.        Lab Results (most recent)     None            Review of Systems   Constitutional: Negative.    HENT: Negative.    Eyes: Negative.    Respiratory: Negative.    Cardiovascular: Negative.    Gastrointestinal: Negative.    Endocrine: Negative.    Genitourinary: Negative.    Musculoskeletal:        Left knee pain occasionally.   Skin: Negative.    Allergic/Immunologic: Negative.    Neurological: Negative.    Hematological: Negative.    Psychiatric/Behavioral: Negative.        Physical Exam   Constitutional: He is oriented to person, place, and time. He appears well-developed and well-nourished. No distress.   Pleasant, neatly groomed, BMI 22.   HENT:   Head: Normocephalic and atraumatic.   Right Ear: External ear normal.   Left Ear: External ear normal.   Nose: Nose normal.   Mouth/Throat: Oropharynx is clear and moist. No oropharyngeal exudate.   Eyes: Conjunctivae and EOM are normal. Pupils are equal, round, and reactive to light. Right eye exhibits no discharge. Left eye exhibits no discharge. No scleral icterus.   Neck: Normal range of motion. Neck supple. No JVD present. No tracheal deviation present. No thyromegaly present.   Cardiovascular: Normal  rate, regular rhythm, normal heart sounds and intact distal pulses. Exam reveals no gallop and no friction rub.   No murmur heard.  Pulmonary/Chest: Effort normal and breath sounds normal. No stridor. No respiratory distress. He has no wheezes. He has no rales. He exhibits no tenderness.   Abdominal: Soft. Bowel sounds are normal. He exhibits no distension and no mass. There is no tenderness. There is no rebound and no guarding. No hernia.   Genitourinary: Rectum normal, prostate normal and penis normal. Rectal exam shows guaiac negative stool. No penile tenderness.   Musculoskeletal: Normal range of motion. He exhibits no edema, tenderness or deformity.   He has some patellofemoral crepitus in the left knee.  There is no effusion.   Lymphadenopathy:     He has no cervical adenopathy.   Neurological: He is alert and oriented to person, place, and time. He displays normal reflexes. No cranial nerve deficit or sensory deficit. He exhibits normal muscle tone. Coordination normal.   Skin: Skin is warm and dry. Capillary refill takes less than 2 seconds. No rash noted. He is not diaphoretic. No erythema. No pallor.   Psychiatric: He has a normal mood and affect. Judgment and thought content normal.   Nursing note and vitals reviewed.      ASSESSMENT       Problem List Items Addressed This Visit        Cardiovascular and Mediastinum    Hypercholesterolemia       Endocrine    Diabetes mellitus type 2 in nonobese (CMS/HCC)       Musculoskeletal and Integument    Left knee pain    Relevant Orders    XR Knee 1 or 2 View Left (In Office) (Completed)       Other    Well adult exam      Other Visit Diagnoses     Screen for colon cancer    -  Primary    Relevant Orders    Cologuard - Stool, Per Rectum    Annual physical exam        Relevant Orders    XR Chest PA & Lateral (Completed)    Screening for rectal cancer        Relevant Orders    Cologuard - Stool, Per Rectum          PLAN  X-ray of his left knee revealed no  abnormality.    I am going to arrange for him to get a Cologuard.    He has type 2 diabetes with excellent control.  His hemoglobin A1c is 6.3%.  I would like to have him back in 4 5 months to recheck a comprehensive metabolic panel, hemoglobin A1c, lipid profile, urine microalbumin etc.      There are no Patient Instructions on file for this visit.    No Follow-up on file.

## 2019-07-30 ENCOUNTER — TELEPHONE (OUTPATIENT)
Dept: FAMILY MEDICINE CLINIC | Facility: CLINIC | Age: 54
End: 2019-07-30

## 2019-07-30 NOTE — TELEPHONE ENCOUNTER
CALLED PT ADVISED PT PER DR. COLEMAN HE IS NEGATIVE ON HIS COLOGAURD AND REPEAT IN 3 YEARS PT UNDERSTOOD.

## 2019-09-12 DIAGNOSIS — E11.9 DIABETES MELLITUS TYPE 2 IN NONOBESE (HCC): Primary | ICD-10-CM

## 2019-09-12 RX ORDER — PANTOPRAZOLE SODIUM 40 MG/1
40 TABLET, DELAYED RELEASE ORAL DAILY
Qty: 90 TABLET | Refills: 1 | Status: SHIPPED | OUTPATIENT
Start: 2019-09-12

## 2019-09-13 RX ORDER — BLOOD-GLUCOSE METER
EACH MISCELLANEOUS
Qty: 1 KIT | Refills: 0 | Status: SHIPPED | OUTPATIENT
Start: 2019-09-13

## 2019-10-15 DIAGNOSIS — E78.00 HYPERCHOLESTEROLEMIA: Primary | ICD-10-CM

## 2019-10-15 DIAGNOSIS — E11.9 DIABETES MELLITUS TYPE 2 IN NONOBESE (HCC): ICD-10-CM

## 2019-10-16 LAB
ALBUMIN SERPL-MCNC: 4.5 G/DL (ref 3.5–5.2)
ALBUMIN/GLOB SERPL: 2 G/DL
ALP SERPL-CCNC: 42 U/L (ref 39–117)
ALT SERPL-CCNC: 17 U/L (ref 1–41)
AST SERPL-CCNC: 17 U/L (ref 1–40)
BILIRUB SERPL-MCNC: 0.7 MG/DL (ref 0.2–1.2)
BUN SERPL-MCNC: 19 MG/DL (ref 6–20)
BUN/CREAT SERPL: 22.1 (ref 7–25)
CALCIUM SERPL-MCNC: 9 MG/DL (ref 8.6–10.5)
CHLORIDE SERPL-SCNC: 104 MMOL/L (ref 98–107)
CHOLEST SERPL-MCNC: 202 MG/DL (ref 0–200)
CO2 SERPL-SCNC: 28.5 MMOL/L (ref 22–29)
CREAT SERPL-MCNC: 0.86 MG/DL (ref 0.76–1.27)
GLOBULIN SER CALC-MCNC: 2.3 GM/DL
GLUCOSE SERPL-MCNC: 120 MG/DL (ref 65–99)
HBA1C MFR BLD: 6 % (ref 4.8–5.6)
HDLC SERPL-MCNC: 52 MG/DL (ref 40–60)
LDLC SERPL CALC-MCNC: 134 MG/DL (ref 0–100)
LDLC/HDLC SERPL: 2.57 {RATIO}
MICROALBUMIN UR-MCNC: 3.5 UG/ML
POTASSIUM SERPL-SCNC: 4.2 MMOL/L (ref 3.5–5.2)
PROT SERPL-MCNC: 6.8 G/DL (ref 6–8.5)
SODIUM SERPL-SCNC: 142 MMOL/L (ref 136–145)
TRIGL SERPL-MCNC: 82 MG/DL (ref 0–150)
VLDLC SERPL CALC-MCNC: 16.4 MG/DL

## 2019-10-31 ENCOUNTER — OFFICE VISIT (OUTPATIENT)
Dept: FAMILY MEDICINE CLINIC | Facility: CLINIC | Age: 54
End: 2019-10-31

## 2019-10-31 VITALS
HEART RATE: 62 BPM | OXYGEN SATURATION: 99 % | WEIGHT: 145.2 LBS | BODY MASS INDEX: 22.01 KG/M2 | SYSTOLIC BLOOD PRESSURE: 112 MMHG | HEIGHT: 68 IN | DIASTOLIC BLOOD PRESSURE: 66 MMHG

## 2019-10-31 DIAGNOSIS — E78.00 HYPERCHOLESTEROLEMIA: ICD-10-CM

## 2019-10-31 DIAGNOSIS — E11.9 DIABETES MELLITUS TYPE 2 IN NONOBESE (HCC): Primary | ICD-10-CM

## 2019-10-31 PROBLEM — R59.0 INGUINAL ADENOPATHY: Status: ACTIVE | Noted: 2019-10-31

## 2019-10-31 PROCEDURE — 99214 OFFICE O/P EST MOD 30 MIN: CPT | Performed by: INTERNAL MEDICINE

## 2019-11-03 NOTE — PROGRESS NOTES
Subjective   Stanislav Martino is a 54 y.o. male. Patient is here today for   Chief Complaint   Patient presents with   • Hyperlipidemia     Follow Up Labs          Vitals:    10/31/19 1316   BP: 112/66   Pulse: 62   SpO2: 99%     Body mass index is 22.08 kg/m².      Past Medical History:   Diagnosis Date   • Diabetes mellitus (CMS/HCC)    • GERD (gastroesophageal reflux disease)    • Hematochezia    • Hypercholesterolemia       No Known Allergies   Social History     Socioeconomic History   • Marital status:      Spouse name: Not on file   • Number of children: Not on file   • Years of education: Not on file   • Highest education level: Not on file   Tobacco Use   • Smoking status: Never Smoker   • Smokeless tobacco: Never Used   Substance and Sexual Activity   • Alcohol use: Yes     Comment: occasional         Current Outpatient Medications:   •  atorvastatin (LIPITOR) 20 MG tablet, Take 1 tablet by mouth Daily., Disp: 90 tablet, Rfl: 3  •  Blood Glucose Monitoring Suppl (ACCU-CHEK VERONICA PLUS) w/Device kit, USE DAILY AS INSTRUCTED FOR TYPE 2 DIABETES, Disp: 1 kit, Rfl: 0  •  fluticasone (FLONASE) 50 MCG/ACT nasal spray, into each nostril daily., Disp: , Rfl:   •  glucose blood (ACCU-CHEK VERONICA PLUS) test strip, CHECK BLOOD SUGAR ONE TIME DAILY, Disp: 100 each, Rfl: 3  •  Lancets (ACCU-CHEK MULTICLIX) lancets, Check glucose once daily, Disp: 102 each, Rfl: 12  •  pantoprazole (PROTONIX) 40 MG EC tablet, Take 1 tablet by mouth Daily., Disp: 90 tablet, Rfl: 1  •  ACCU-CHEK SOFTCLIX LANCETS lancets, Use as instructed, Disp: 100 each, Rfl: 12     Objective     He is here to follow-up on his type 2 diabetes.    He has no complaints.         Review of Systems   Constitutional: Negative.    HENT: Negative.    Respiratory: Negative.    Cardiovascular: Negative.    Musculoskeletal: Negative.    Psychiatric/Behavioral: Negative.        Physical Exam   Constitutional: He is oriented to person, place, and time. He appears  well-developed and well-nourished.   Pleasant, neatly groomed, BMI 22.   HENT:   Head: Normocephalic and atraumatic.   Cardiovascular: Normal rate, regular rhythm and normal heart sounds.   Pulmonary/Chest: Effort normal and breath sounds normal. No respiratory distress.   Neurological: He is alert and oriented to person, place, and time.   Psychiatric: He has a normal mood and affect. His behavior is normal.   Nursing note and vitals reviewed.        Problem List Items Addressed This Visit        Cardiovascular and Mediastinum    Hypercholesterolemia       Endocrine    Diabetes mellitus type 2 in nonobese (CMS/HCC) - Primary            PLAN  He has hypercholesterolemia.  This is relatively well controlled on atorvastatin 20 mg daily.  I believe this alone for now and recheck this in about 6 months.    He has outstandingly good control with his type 2 diabetes with a hemoglobin A1c of less than 6%.  He is achieved this through diet and exercise alone.    I would like to see him back since 6 months.  Prior to that appointment, he should have the following labs done a week before: PSA, comprehensive metabolic panel, hemoglobin A1c, urine microalbumin, CBC, and lipid profile.    He should follow-up for a comprehensive physical examination once yearly.  No Follow-up on file.

## 2020-01-06 ENCOUNTER — OFFICE (AMBULATORY)
Dept: URBAN - METROPOLITAN AREA CLINIC 75 | Facility: CLINIC | Age: 55
End: 2020-01-06

## 2020-01-06 VITALS
WEIGHT: 144 LBS | HEART RATE: 68 BPM | DIASTOLIC BLOOD PRESSURE: 64 MMHG | SYSTOLIC BLOOD PRESSURE: 122 MMHG | HEIGHT: 68 IN

## 2020-01-06 DIAGNOSIS — K30 FUNCTIONAL DYSPEPSIA: ICD-10-CM

## 2020-01-06 DIAGNOSIS — R10.13 EPIGASTRIC PAIN: ICD-10-CM

## 2020-01-06 DIAGNOSIS — Z80.0 FAMILY HISTORY OF MALIGNANT NEOPLASM OF DIGESTIVE ORGANS: ICD-10-CM

## 2020-01-06 PROCEDURE — 99204 OFFICE O/P NEW MOD 45 MIN: CPT | Performed by: INTERNAL MEDICINE

## 2020-01-06 NOTE — SERVICEHPINOTES
Thank you very much for referring Mister Yates for evaluation. He should know he is a pleasant 54-year-old Chinese gentleman whose had "stomach trouble" for about 3 years. He'll have intermittent episodes of burning which comes and goes, symptoms can last a few days to several weeks and then resolved. They will relapse randomly. Recently symptoms have been getting worse and more frequent. He says that the symptoms will now waking up at night which is new. Symptoms are worse with beer and wine but he says a lot of times when he eats symptoms improve. He does not eat lunch during the day, the symptoms seem to be most prominent at night. He points to the epigastric area. There is no nausea or vomiting. He has occasional reflux. He doesn't smoke or use nonsteroidals he's had no abdominal surgery. He avoids spicy foods. He does have an uncle who had gastric cancer. He does tell me that he was initially put on pantoprazole, it seemed to help briefly but symptoms relapse, he is now been on omeprazole for a short period of time and symptoms have improved.He has no lower GI complaints other than he said he had some lower abdominal discomfort a few days ago which was acute. There is no diarrhea, constipation or blood in the stool. There is no family history of polyps colitis or colon cancer. He had a negative colon last year. He is in no distress, he does not look acutely ill.

## 2020-01-06 NOTE — SERVICENOTES
records reviewed.  Colon Chung negative.  Cholesterol 202.  .  Remainder of lipid panel within normal limits.  Glucose 120.  Remainder of comprehensive panel within normal limits.

## 2020-01-21 VITALS
SYSTOLIC BLOOD PRESSURE: 91 MMHG | SYSTOLIC BLOOD PRESSURE: 94 MMHG | RESPIRATION RATE: 18 BRPM | SYSTOLIC BLOOD PRESSURE: 97 MMHG | DIASTOLIC BLOOD PRESSURE: 60 MMHG | SYSTOLIC BLOOD PRESSURE: 98 MMHG | HEART RATE: 64 BPM | HEART RATE: 68 BPM | HEART RATE: 69 BPM | SYSTOLIC BLOOD PRESSURE: 125 MMHG | OXYGEN SATURATION: 98 % | HEART RATE: 65 BPM | DIASTOLIC BLOOD PRESSURE: 59 MMHG | SYSTOLIC BLOOD PRESSURE: 89 MMHG | DIASTOLIC BLOOD PRESSURE: 71 MMHG | RESPIRATION RATE: 14 BRPM | DIASTOLIC BLOOD PRESSURE: 65 MMHG | SYSTOLIC BLOOD PRESSURE: 95 MMHG | SYSTOLIC BLOOD PRESSURE: 92 MMHG | SYSTOLIC BLOOD PRESSURE: 85 MMHG | RESPIRATION RATE: 16 BRPM | DIASTOLIC BLOOD PRESSURE: 63 MMHG | OXYGEN SATURATION: 100 % | RESPIRATION RATE: 15 BRPM | HEART RATE: 78 BPM | HEART RATE: 62 BPM | HEART RATE: 60 BPM | DIASTOLIC BLOOD PRESSURE: 78 MMHG | DIASTOLIC BLOOD PRESSURE: 67 MMHG | HEART RATE: 57 BPM | OXYGEN SATURATION: 99 % | DIASTOLIC BLOOD PRESSURE: 64 MMHG | DIASTOLIC BLOOD PRESSURE: 62 MMHG | SYSTOLIC BLOOD PRESSURE: 131 MMHG | RESPIRATION RATE: 17 BRPM | HEART RATE: 72 BPM | HEART RATE: 63 BPM | RESPIRATION RATE: 9 BRPM | HEART RATE: 51 BPM | HEIGHT: 68 IN | HEART RATE: 55 BPM | WEIGHT: 145 LBS | SYSTOLIC BLOOD PRESSURE: 103 MMHG | DIASTOLIC BLOOD PRESSURE: 79 MMHG | DIASTOLIC BLOOD PRESSURE: 51 MMHG | TEMPERATURE: 97.2 F

## 2020-01-22 ENCOUNTER — AMBULATORY SURGICAL CENTER (AMBULATORY)
Dept: URBAN - METROPOLITAN AREA SURGERY 17 | Facility: SURGERY | Age: 55
End: 2020-01-22
Payer: COMMERCIAL

## 2020-01-22 ENCOUNTER — OFFICE (AMBULATORY)
Dept: URBAN - METROPOLITAN AREA PATHOLOGY 4 | Facility: PATHOLOGY | Age: 55
End: 2020-01-22
Payer: COMMERCIAL

## 2020-01-22 DIAGNOSIS — B96.81 HELICOBACTER PYLORI [H. PYLORI] AS THE CAUSE OF DISEASES CLA: ICD-10-CM

## 2020-01-22 DIAGNOSIS — K29.50 UNSPECIFIED CHRONIC GASTRITIS WITHOUT BLEEDING: ICD-10-CM

## 2020-01-22 DIAGNOSIS — K31.89 OTHER DISEASES OF STOMACH AND DUODENUM: ICD-10-CM

## 2020-01-22 DIAGNOSIS — K29.70 GASTRITIS, UNSPECIFIED, WITHOUT BLEEDING: ICD-10-CM

## 2020-01-22 DIAGNOSIS — D12.0 BENIGN NEOPLASM OF CECUM: ICD-10-CM

## 2020-01-22 DIAGNOSIS — D12.3 BENIGN NEOPLASM OF TRANSVERSE COLON: ICD-10-CM

## 2020-01-22 DIAGNOSIS — R10.13 EPIGASTRIC PAIN: ICD-10-CM

## 2020-01-22 DIAGNOSIS — Z12.11 ENCOUNTER FOR SCREENING FOR MALIGNANT NEOPLASM OF COLON: ICD-10-CM

## 2020-01-22 DIAGNOSIS — K63.5 POLYP OF COLON: ICD-10-CM

## 2020-01-22 DIAGNOSIS — D12.7 BENIGN NEOPLASM OF RECTOSIGMOID JUNCTION: ICD-10-CM

## 2020-01-22 LAB
GI HISTOLOGY: A. UNSPECIFIED: (no result)
GI HISTOLOGY: B. SELECT: (no result)
GI HISTOLOGY: C. UNSPECIFIED: (no result)
GI HISTOLOGY: D. UNSPECIFIED: (no result)
GI HISTOLOGY: E. UNSPECIFIED: (no result)
GI HISTOLOGY: F. UNSPECIFIED: (no result)
GI HISTOLOGY: G. SELECT: (no result)
GI HISTOLOGY: PDF REPORT: (no result)

## 2020-01-22 PROCEDURE — 45380 COLONOSCOPY AND BIOPSY: CPT | Mod: 33,59 | Performed by: INTERNAL MEDICINE

## 2020-01-22 PROCEDURE — 45385 COLONOSCOPY W/LESION REMOVAL: CPT | Mod: 33 | Performed by: INTERNAL MEDICINE

## 2020-01-22 PROCEDURE — 88305 TISSUE EXAM BY PATHOLOGIST: CPT | Mod: 33 | Performed by: INTERNAL MEDICINE

## 2020-01-22 PROCEDURE — 43239 EGD BIOPSY SINGLE/MULTIPLE: CPT | Mod: 59 | Performed by: INTERNAL MEDICINE

## 2020-04-21 VITALS — WEIGHT: 142 LBS | HEIGHT: 68 IN

## 2020-04-23 ENCOUNTER — TELEHEALTH PROVIDED OTHER THAN IN PATIENT'S HOME (AMBULATORY)
Dept: URBAN - METROPOLITAN AREA TELEHEALTH 6 | Facility: TELEHEALTH | Age: 55
End: 2020-04-23
Payer: COMMERCIAL

## 2020-04-23 DIAGNOSIS — K29.70 GASTRITIS, UNSPECIFIED, WITHOUT BLEEDING: ICD-10-CM

## 2020-04-23 DIAGNOSIS — B96.81 HELICOBACTER PYLORI [H. PYLORI] AS THE CAUSE OF DISEASES CLA: ICD-10-CM

## 2020-04-23 DIAGNOSIS — Z12.11 ENCOUNTER FOR SCREENING FOR MALIGNANT NEOPLASM OF COLON: ICD-10-CM

## 2020-04-23 DIAGNOSIS — K30 FUNCTIONAL DYSPEPSIA: ICD-10-CM

## 2020-04-23 DIAGNOSIS — Z80.0 FAMILY HISTORY OF MALIGNANT NEOPLASM OF DIGESTIVE ORGANS: ICD-10-CM

## 2020-04-23 DIAGNOSIS — K31.89 OTHER DISEASES OF STOMACH AND DUODENUM: ICD-10-CM

## 2020-04-23 DIAGNOSIS — D12.7 BENIGN NEOPLASM OF RECTOSIGMOID JUNCTION: ICD-10-CM

## 2020-04-23 DIAGNOSIS — R10.13 EPIGASTRIC PAIN: ICD-10-CM

## 2020-04-23 DIAGNOSIS — D12.0 BENIGN NEOPLASM OF CECUM: ICD-10-CM

## 2020-04-23 DIAGNOSIS — K21.9 GASTRO-ESOPHAGEAL REFLUX DISEASE WITHOUT ESOPHAGITIS: ICD-10-CM

## 2020-04-23 DIAGNOSIS — D12.3 BENIGN NEOPLASM OF TRANSVERSE COLON: ICD-10-CM

## 2020-04-23 PROCEDURE — 99213 OFFICE O/P EST LOW 20 MIN: CPT | Mod: 95 | Performed by: INTERNAL MEDICINE

## 2020-08-27 ENCOUNTER — TELEPHONE (OUTPATIENT)
Dept: ONCOLOGY | Facility: CLINIC | Age: 55
End: 2020-08-27

## 2020-08-27 NOTE — TELEPHONE ENCOUNTER
----- Message from Hortensia Mohamud MD PhD sent at 8/27/2020 11:43 AM EDT -----  Regarding: New patient  Estee,  This is a new patient, self-referred,   arrange this patient to see me on September 8 Tuesday noontime, I needed 3 units for this patient he has a left abdomen mass/nodule, suspected for malignancy.      Thank you very much!      Cade

## 2020-08-27 NOTE — TELEPHONE ENCOUNTER
Called pt and discussed wes't, location, etc. He VU, message sent to hub to send new pt packet, directions, also requested Lian Upton to obtain records [facility]

## 2020-09-08 ENCOUNTER — CONSULT (OUTPATIENT)
Dept: ONCOLOGY | Facility: CLINIC | Age: 55
End: 2020-09-08

## 2020-09-08 ENCOUNTER — LAB (OUTPATIENT)
Dept: LAB | Facility: HOSPITAL | Age: 55
End: 2020-09-08

## 2020-09-08 VITALS
TEMPERATURE: 97.5 F | DIASTOLIC BLOOD PRESSURE: 77 MMHG | WEIGHT: 143.2 LBS | OXYGEN SATURATION: 100 % | BODY MASS INDEX: 22.47 KG/M2 | HEART RATE: 65 BPM | HEIGHT: 67 IN | SYSTOLIC BLOOD PRESSURE: 130 MMHG | RESPIRATION RATE: 16 BRPM

## 2020-09-08 DIAGNOSIS — D72.819 LEUKOPENIA, UNSPECIFIED TYPE: ICD-10-CM

## 2020-09-08 DIAGNOSIS — R10.31 RIGHT LOWER QUADRANT ABDOMINAL PAIN: Primary | ICD-10-CM

## 2020-09-08 DIAGNOSIS — R19.00 ABDOMINAL MASS, UNSPECIFIED ABDOMINAL LOCATION: Primary | ICD-10-CM

## 2020-09-08 DIAGNOSIS — D64.9 ANEMIA, UNSPECIFIED TYPE: ICD-10-CM

## 2020-09-08 DIAGNOSIS — R59.0 INGUINAL ADENOPATHY: ICD-10-CM

## 2020-09-08 LAB
BASOPHILS # BLD AUTO: 0.03 10*3/MM3 (ref 0–0.2)
BASOPHILS NFR BLD AUTO: 0.8 % (ref 0–1.5)
DEPRECATED RDW RBC AUTO: 36.7 FL (ref 37–54)
EOSINOPHIL # BLD AUTO: 0.07 10*3/MM3 (ref 0–0.4)
EOSINOPHIL NFR BLD AUTO: 2 % (ref 0.3–6.2)
ERYTHROCYTE [DISTWIDTH] IN BLOOD BY AUTOMATED COUNT: 11.9 % (ref 12.3–15.4)
HCT VFR BLD AUTO: 38.8 % (ref 37.5–51)
HGB BLD-MCNC: 13.4 G/DL (ref 13–17.7)
IMM GRANULOCYTES # BLD AUTO: 0.04 10*3/MM3 (ref 0–0.05)
IMM GRANULOCYTES NFR BLD AUTO: 1.1 % (ref 0–0.5)
LYMPHOCYTES # BLD AUTO: 1.11 10*3/MM3 (ref 0.7–3.1)
LYMPHOCYTES NFR BLD AUTO: 31.4 % (ref 19.6–45.3)
MCH RBC QN AUTO: 29.5 PG (ref 26.6–33)
MCHC RBC AUTO-ENTMCNC: 34.5 G/DL (ref 31.5–35.7)
MCV RBC AUTO: 85.5 FL (ref 79–97)
MONOCYTES # BLD AUTO: 0.16 10*3/MM3 (ref 0.1–0.9)
MONOCYTES NFR BLD AUTO: 4.5 % (ref 5–12)
NEUTROPHILS NFR BLD AUTO: 2.13 10*3/MM3 (ref 1.7–7)
NEUTROPHILS NFR BLD AUTO: 60.2 % (ref 42.7–76)
NRBC BLD AUTO-RTO: 0 /100 WBC (ref 0–0.2)
PLATELET # BLD AUTO: 142 10*3/MM3 (ref 140–450)
PMV BLD AUTO: 9.9 FL (ref 6–12)
RBC # BLD AUTO: 4.54 10*6/MM3 (ref 4.14–5.8)
WBC # BLD AUTO: 3.54 10*3/MM3 (ref 3.4–10.8)

## 2020-09-08 PROCEDURE — 99244 OFF/OP CNSLTJ NEW/EST MOD 40: CPT | Performed by: INTERNAL MEDICINE

## 2020-09-08 PROCEDURE — 36415 COLL VENOUS BLD VENIPUNCTURE: CPT

## 2020-09-08 PROCEDURE — 85025 COMPLETE CBC W/AUTO DIFF WBC: CPT

## 2020-09-08 NOTE — PROGRESS NOTES
.     REASON FOR CONSULTATION:     Provide an opinion on any further workup or treatment vague abdomen pain in the groin lymphadenopathy.    RECORDS OBTAINED:  Records of the patients history including those obtained from the referring provider were reviewed and summarized in detail.    HISTORY OF PRESENT ILLNESS:  The patient is a 55 y.o. year old male patient who presented today for initial evaluation because of vague abdominal pain and heaviness in the pelvis after his urination and right lower abdomen nodule.     Patient reports he has recently not been feeling well, decreased weight, maybe 3-4 pounds in the past month despite no change of her diet and heaviness in his pelvic area after urination. He has vague abdominal pain and also self-palpated a nodule in the right lower abdomen; however, denies diarrhea, melena or hematochezia. He denies dysuria or hematuria either. Patient reports palpable nodularity in the right groin area. Patient reports no headaches, no vision changes, no dyspnea, no chest pain. No cough, no hemoptysis. He does not smoke cigarettes. He is a social drinker. He has family history for bladder cancer in his father.     Patient reports no night sweats, no fever, sweating or chills. No nausea or vomiting.     Laboratory study dating back to 02/10/2016 reported mild leukocytopenia with WBC 3640 and neutrophils 1700, lymphocytes 1400, monocytes 300, marginal hemoglobin 13.8, MCV 88.8, MCHC 32.9, and normal platelets 154,000. Chemistry lab on the same day reported elevated ALT 53, but otherwise normal liver function panel, total protein 7.1, and albumin 4.6, normal electrolytes and elevated glucose 152. Also had elevated total cholesterol 214 and LDH cholesterol 144, normal HDL. Subsequent CBC on 10/25/2020 reported anemia hemoglobin 13.1, MCV 88.7, MCHC 32.8. Platelets 148,000 and WBC 4110 including ANC 2290, lymphocytes 1460. Chemistry lab showed improved normalized lipid profile and  glucose 131, otherwise unremarkable CMP, hemoglobin A1C was 5.73%. Most recent chemistry lab on 10/15/2019 reported hemoglobin A1C 6.0%, and glucose 122 otherwise unremarkable CMP with creatinine 0.86, and normal liver function panel. He had mildly abnormal lipid profile.     Laboratory study today reported WBC 3540, including ANC 2130, and hemoglobin 13.4, MCV 85.5, platelets 142,000.    The patient reports no dysuria, hematuria. He has family history of bladder cancer in his father.          Past Medical History:   Diagnosis Date   • Diabetes mellitus (CMS/HCC)    • GERD (gastroesophageal reflux disease)    • Hematochezia    • Hypercholesterolemia      No past surgical history on file.    MEDICATIONS    Current Outpatient Medications:   •  atorvastatin (LIPITOR) 20 MG tablet, Take 1 tablet by mouth Daily., Disp: 90 tablet, Rfl: 3  •  Blood Glucose Monitoring Suppl (ACCU-CHEK VERONICA PLUS) w/Device kit, USE DAILY AS INSTRUCTED FOR TYPE 2 DIABETES, Disp: 1 kit, Rfl: 0  •  fluticasone (FLONASE) 50 MCG/ACT nasal spray, into each nostril daily., Disp: , Rfl:   •  glucose blood (ACCU-CHEK VERONICA PLUS) test strip, CHECK BLOOD SUGAR ONE TIME DAILY, Disp: 90 each, Rfl: 3  •  Lancets (ACCU-CHEK MULTICLIX) lancets, Check glucose once daily, Disp: 102 each, Rfl: 12  •  pantoprazole (PROTONIX) 40 MG EC tablet, Take 1 tablet by mouth Daily., Disp: 90 tablet, Rfl: 1  •  ACCU-CHEK SOFTCLIX LANCETS lancets, Use as instructed, Disp: 100 each, Rfl: 12    ALLERGIES:   No Known Allergies    SOCIAL HISTORY:       Social History     Socioeconomic History   • Marital status:      Spouse name: Stevie Dhaliwal   • Number of children: Not on file   • Years of education: Not on file   • Highest education level: Not on file   Occupational History     Employer: [employer]   Tobacco Use   • Smoking status: Never Smoker   • Smokeless tobacco: Never Used   Substance and Sexual Activity   • Alcohol use: Yes     Comment: occasional          FAMILY  "HISTORY:  Family History   Problem Relation Age of Onset   • Diabetes Mother    · His father was diagnosed of bladder cancer in 2020.       REVIEW OF SYSTEMS:  Review of Systems   Constitutional: Positive for fatigue and unexpected weight change (3 to 4 pound weight loss in the past month, unintentional). Negative for activity change, appetite change, diaphoresis and fever.   HENT: Negative for nosebleeds, sore throat and trouble swallowing.    Eyes: Negative for photophobia and visual disturbance.   Respiratory: Negative for cough and shortness of breath.    Cardiovascular: Negative for chest pain, palpitations and leg swelling.   Gastrointestinal: Negative for abdominal pain, blood in stool and nausea.   Endocrine: Negative for cold intolerance and heat intolerance.   Genitourinary: Negative for dysuria and hematuria.   Musculoskeletal: Negative for arthralgias, gait problem and joint swelling.   Skin: Negative for color change and pallor.   Allergic/Immunologic: Negative for environmental allergies and immunocompromised state.   Neurological: Negative for dizziness, numbness and headaches.   Hematological: Positive for adenopathy (Right groin). Does not bruise/bleed easily.   Psychiatric/Behavioral: Negative for agitation and confusion.              Vitals:    09/08/20 1214   BP: 130/77   Pulse: 65   Resp: 16   Temp: 97.5 °F (36.4 °C)   SpO2: 100%   Weight: 65 kg (143 lb 3.2 oz)   Height: 170 cm (66.93\")   PainSc:   4   PainLoc: Back     Current Status 9/8/2020   ECOG score 0      PHYSICAL EXAM:      CONSTITUTIONAL:  Vital signs reviewed.  Well-developed well-nourished  male, no distress, looks comfortable.  EYES:  Conjunctiva and lids unremarkable.  Pupils equal and round.  EARS,NOSE,MOUTH,THROAT:  Ears appear unremarkable. Patient wears mask due to the pandemic coronavirus infection.   RESPIRATORY:  Normal respiratory effort.  Lungs clear to auscultation bilaterally.  CARDIOVASCULAR: Regular rhythm and " rate.  Normal S1, S2.  No murmurs rubs or gallops.  No significant lower extremity edema.  GASTROINTESTINAL: Abdomen appears unremarkable.  Nontender.  No hepatomegaly.  No splenomegaly.  Bowel sounds normal.  LYMPHATIC:  No cervical, supraclavicular, axillary lymphadenopathy.  Right groin has fullness, and may be lymphadenopathy, and right above the groin, there is subcutaneous a small nodule about 7 mm.  MUSCULOSKELETAL:  Unremarkable gait and station.  Unremarkable digits/nails.  No cyanosis or clubbing.  SKIN:  Warm.  No rashes.  PSYCHIATRIC:  Normal judgment and insight.  Normal mood and affect.      RECENT LABS:        WBC   Date Value Ref Range Status   09/08/2020 3.54 3.40 - 10.80 10*3/mm3 Final   10/25/2018 4.11 (L) 4.50 - 10.70 10*3/mm3 Final   02/10/2016 3.64 (L) 4.50 - 10.70 K/Cumm Final   02/10/2016 3-5 (A) /hpf Final     Comment:     REFERENCE RANGE: None Seen,0-2     Hemoglobin   Date Value Ref Range Status   09/08/2020 13.4 13.0 - 17.7 g/dL Final   10/25/2018 13.1 (L) 13.7 - 17.6 g/dL Final   02/10/2016 13.8 13.7 - 17.6 g/dL Final     Platelets   Date Value Ref Range Status   09/08/2020 142 140 - 450 10*3/mm3 Final   10/25/2018 148 140 - 500 10*3/mm3 Final   02/10/2016 154 140 - 500 K/Cumm Final       Assessment/Plan     ASSESSMENT:   1. Vague abdomen pain/heaviness in the pelvis after his urination, with fatigue and palpable right groin nodularity. Upon examination he has some fullness in the right groin but no mass palpable. He has no abdomen pain or guarding, no rebound. No splenomegaly or hepatomegaly. I recommended to have CT scan examination for the abdomen and pelvis for further evaluation. He denies hematuria.     2. Mild anemia. The patient has some chronic fatigue but denies any apparent bleeding. I recommended to have laboratory study including ferritin and iron profile together with B12, folic acid for evaluation.     3. Mild leukocytopenia with occasional neutropenia. The patient denies  recurrent infection. Nevertheless I think this needs to be checked. Vitamin B12 level, CMP and CT scan of the abdomen and pelvis may provide some information. I do not feel liver or spleen by examination today.       PLAN:   1.  Laboratory studies  - Ferritin  - Iron Profile  - Vitamin B12  - Folate  - Comprehensive Metabolic Panel  2.  - CT Abdomen Pelvis With Contrast   3.  Patient will return in 1 week for reevaluation to discuss results of CT scan and laboratory studies.       DEANDRE PATHAK M.D., Ph.D.    9/8/2020        CC:  Dr. Karen Hubbard MD

## 2020-09-10 ENCOUNTER — LAB (OUTPATIENT)
Dept: LAB | Facility: HOSPITAL | Age: 55
End: 2020-09-10

## 2020-09-10 ENCOUNTER — HOSPITAL ENCOUNTER (OUTPATIENT)
Dept: CT IMAGING | Facility: HOSPITAL | Age: 55
Discharge: HOME | End: 2020-09-10

## 2020-09-10 DIAGNOSIS — D64.9 ANEMIA, UNSPECIFIED TYPE: ICD-10-CM

## 2020-09-10 DIAGNOSIS — R10.31 RIGHT LOWER QUADRANT ABDOMINAL PAIN: ICD-10-CM

## 2020-09-10 DIAGNOSIS — R59.0 INGUINAL ADENOPATHY: ICD-10-CM

## 2020-09-10 LAB
ALBUMIN SERPL-MCNC: 4.8 G/DL (ref 3.5–5.2)
ALBUMIN/GLOB SERPL: 1.7 G/DL
ALP SERPL-CCNC: 47 U/L (ref 39–117)
ALT SERPL W P-5'-P-CCNC: 15 U/L (ref 1–41)
ANION GAP SERPL CALCULATED.3IONS-SCNC: 7.6 MMOL/L (ref 5–15)
AST SERPL-CCNC: 17 U/L (ref 1–40)
BILIRUB SERPL-MCNC: 0.5 MG/DL (ref 0–1.2)
BUN SERPL-MCNC: 15 MG/DL (ref 6–20)
BUN/CREAT SERPL: 16.7 (ref 7–25)
CALCIUM SPEC-SCNC: 9.5 MG/DL (ref 8.6–10.5)
CHLORIDE SERPL-SCNC: 103 MMOL/L (ref 98–107)
CO2 SERPL-SCNC: 30.4 MMOL/L (ref 22–29)
CREAT SERPL-MCNC: 0.9 MG/DL (ref 0.76–1.27)
FERRITIN SERPL-MCNC: 95.7 NG/ML (ref 30–400)
FOLATE SERPL-MCNC: 17.2 NG/ML (ref 4.78–24.2)
GFR SERPL CREATININE-BSD FRML MDRD: 106 ML/MIN/1.73
GFR SERPL CREATININE-BSD FRML MDRD: 87 ML/MIN/1.73
GLOBULIN UR ELPH-MCNC: 2.8 GM/DL
GLUCOSE SERPL-MCNC: 134 MG/DL (ref 65–99)
IRON 24H UR-MRATE: 54 MCG/DL (ref 59–158)
IRON SATN MFR SERPL: 17 % (ref 20–50)
POTASSIUM SERPL-SCNC: 4.2 MMOL/L (ref 3.5–5.2)
PROT SERPL-MCNC: 7.6 G/DL (ref 6–8.5)
SODIUM SERPL-SCNC: 141 MMOL/L (ref 136–145)
TIBC SERPL-MCNC: 325 MCG/DL (ref 298–536)
TRANSFERRIN SERPL-MCNC: 218 MG/DL (ref 200–360)
VIT B12 BLD-MCNC: 477 PG/ML (ref 211–946)

## 2020-09-10 PROCEDURE — 82746 ASSAY OF FOLIC ACID SERUM: CPT | Performed by: INTERNAL MEDICINE

## 2020-09-10 PROCEDURE — 74177 CT ABD & PELVIS W/CONTRAST: CPT

## 2020-09-10 PROCEDURE — 82728 ASSAY OF FERRITIN: CPT | Performed by: INTERNAL MEDICINE

## 2020-09-10 PROCEDURE — 0 DIATRIZOATE MEGLUMINE & SODIUM PER 1 ML: Performed by: INTERNAL MEDICINE

## 2020-09-10 PROCEDURE — 82565 ASSAY OF CREATININE: CPT

## 2020-09-10 PROCEDURE — 83540 ASSAY OF IRON: CPT | Performed by: INTERNAL MEDICINE

## 2020-09-10 PROCEDURE — 25010000002 IOPAMIDOL 61 % SOLUTION: Performed by: INTERNAL MEDICINE

## 2020-09-10 PROCEDURE — 82607 VITAMIN B-12: CPT | Performed by: INTERNAL MEDICINE

## 2020-09-10 PROCEDURE — 36415 COLL VENOUS BLD VENIPUNCTURE: CPT

## 2020-09-10 PROCEDURE — 80053 COMPREHEN METABOLIC PANEL: CPT

## 2020-09-10 PROCEDURE — 84466 ASSAY OF TRANSFERRIN: CPT | Performed by: INTERNAL MEDICINE

## 2020-09-10 RX ADMIN — IOPAMIDOL 85 ML: 612 INJECTION, SOLUTION INTRAVENOUS at 09:33

## 2020-09-10 RX ADMIN — DIATRIZOATE MEGLUMINE AND DIATRIZOATE SODIUM 30 ML: 660; 100 LIQUID ORAL; RECTAL at 08:30

## 2020-09-11 LAB — CREAT BLDA-MCNC: 0.9 MG/DL (ref 0.6–1.3)

## 2020-09-15 ENCOUNTER — TELEPHONE (OUTPATIENT)
Dept: ONCOLOGY | Facility: CLINIC | Age: 55
End: 2020-09-15

## 2020-09-15 DIAGNOSIS — R93.41 ABNORMAL CT SCAN, BLADDER: Primary | ICD-10-CM

## 2020-09-15 NOTE — TELEPHONE ENCOUNTER
Called pt and advised of wes't with [facility],he is aware, verbally gave him all information, df

## 2020-09-15 NOTE — TELEPHONE ENCOUNTER
----- Message from Hortensia Mohamud MD PhD sent at 9/15/2020 10:02 AM EDT -----  Regarding: Refer to urology  Estee,     I just put a order for referral to [facility] Dr. Eddi Langford for abnormal bladder CT scan examination.   I just spoke with patient.     Thanks!    Cade

## 2020-09-17 ENCOUNTER — OFFICE VISIT (OUTPATIENT)
Dept: ONCOLOGY | Facility: CLINIC | Age: 55
End: 2020-09-17

## 2020-09-17 ENCOUNTER — APPOINTMENT (OUTPATIENT)
Dept: OTHER | Facility: HOSPITAL | Age: 55
End: 2020-09-17

## 2020-09-17 VITALS
HEIGHT: 67 IN | TEMPERATURE: 98 F | DIASTOLIC BLOOD PRESSURE: 69 MMHG | WEIGHT: 142.1 LBS | RESPIRATION RATE: 14 BRPM | BODY MASS INDEX: 22.3 KG/M2 | SYSTOLIC BLOOD PRESSURE: 106 MMHG | HEART RATE: 75 BPM | OXYGEN SATURATION: 96 %

## 2020-09-17 DIAGNOSIS — D50.9 IRON DEFICIENCY ANEMIA, UNSPECIFIED IRON DEFICIENCY ANEMIA TYPE: ICD-10-CM

## 2020-09-17 DIAGNOSIS — R93.41 ABNORMAL CT SCAN, BLADDER: Primary | ICD-10-CM

## 2020-09-17 DIAGNOSIS — D72.819 LEUKOPENIA, UNSPECIFIED TYPE: ICD-10-CM

## 2020-09-17 PROCEDURE — 99215 OFFICE O/P EST HI 40 MIN: CPT | Performed by: INTERNAL MEDICINE

## 2020-09-17 RX ORDER — LANOLIN ALCOHOL/MO/W.PET/CERES
1000 CREAM (GRAM) TOPICAL DAILY
Qty: 90 TABLET | Refills: 3 | Status: SHIPPED | OUTPATIENT
Start: 2020-09-17

## 2020-09-17 RX ORDER — FERROUS SULFATE 325(65) MG
325 TABLET ORAL
Qty: 90 TABLET | Refills: 3 | Status: SHIPPED | OUTPATIENT
Start: 2020-09-17

## 2020-09-17 NOTE — PROGRESS NOTES
.     REASON FOR CONSULTATION:     1. Vague abdomen pain and groin lymphadenopathy.   2. CT scan for abdomen pelvis on 9/10/2020 reported diffuse thickening of bladder wall, no lymphadenopathy.        HISTORY OF PRESENT ILLNESS:  The patient is a 55 y.o. year old male patient who presented today on 9/17/2020 to discuss the results of CT scan examination and laboratory studies.    Patient was advised and 9/8/2020 for initial evaluation because of vague symptoms of lower abdomen/pelvic area discomfort monitored and self-reported right groin lymphadenopathy    Patient reports no night sweats, no fever, sweating or chills. No nausea or vomiting. The patient reports no dysuria, hematuria. He has family history of bladder cancer in his father.    Patient reports he has recently not been feeling well, decreased weight, maybe 3-4 pounds in the past month despite no change of her diet and heaviness in his pelvic area after urination. He has vague abdominal pain and also self-palpated a nodule in the right lower abdomen; however, denies diarrhea, melena or hematochezia. He denies dysuria or hematuria either. Patient reports palpable nodularity in the right groin area. Patient reports no headaches, no vision changes, no dyspnea, no chest pain. No cough, no hemoptysis. He does not smoke cigarettes. He is a social drinker.     His CT scan examination for abdomen and pelvis on 9/10/2020 reported diffuse thickening of the bladder wall, otherwise unremarkable with no lymphadenopathy in the abdomen or pelvis.    Laboratory study on 9/10/2020 reported ferritin 95.7 iron saturation 17% with free iron 54 and TIBC 325, folate is 17.2 ng/mL and vitamin B12 at 477 pg/mL.  He had unremarkable CMP including total protein 7.6 and albumin 4.8.  It also reported WBC 3540, including ANC 2130, and hemoglobin 13.4, MCV 85.5, platelets 142,000.        Past Medical History:   Diagnosis Date   • Diabetes mellitus (CMS/HCC)    • GERD  (gastroesophageal reflux disease)    • Hematochezia    • Hypercholesterolemia      No past surgical history on file.     HEMATOLOGY/ONCOLOGY HISTORY:  The patient is a 55 y.o. year old male patient who presented on 9/8/2020 for initial evaluation because of vague abdominal pain and heaviness in the pelvis after his urination and right lower abdomen nodule.     Patient reports he has recently not been feeling well, decreased weight, maybe 3-4 pounds in the past month despite no change of her diet and heaviness in his pelvic area after urination. He has vague abdominal pain and also self-palpated a nodule in the right lower abdomen; however, denies diarrhea, melena or hematochezia. He denies dysuria or hematuria either. Patient reports palpable nodularity in the right groin area. Patient reports no headaches, no vision changes, no dyspnea, no chest pain. No cough, no hemoptysis. He does not smoke cigarettes. He is a social drinker. He has family history for bladder cancer in his father.     Patient reports no night sweats, no fever, sweating or chills. No nausea or vomiting.     Laboratory study dating back to 02/10/2016 reported mild leukocytopenia with WBC 3640 and neutrophils 1700, lymphocytes 1400, monocytes 300, marginal hemoglobin 13.8, MCV 88.8, MCHC 32.9, and normal platelets 154,000. Chemistry lab on the same day reported elevated ALT 53, but otherwise normal liver function panel, total protein 7.1, and albumin 4.6, normal electrolytes and elevated glucose 152. Also had elevated total cholesterol 214 and LDH cholesterol 144, normal HDL. Subsequent CBC on 10/25/2020 reported anemia hemoglobin 13.1, MCV 88.7, MCHC 32.8. Platelets 148,000 and WBC 4110 including ANC 2290, lymphocytes 1460. Chemistry lab showed improved normalized lipid profile and glucose 131, otherwise unremarkable CMP, hemoglobin A1C was 5.73%. Most recent chemistry lab on 10/15/2019 reported hemoglobin A1C 6.0%, and glucose 122 otherwise  unremarkable CMP with creatinine 0.86, and normal liver function panel. He had mildly abnormal lipid profile.         MEDICATIONS    Current Outpatient Medications:   •  atorvastatin (LIPITOR) 20 MG tablet, Take 1 tablet by mouth Daily., Disp: 90 tablet, Rfl: 3  •  Blood Glucose Monitoring Suppl (ACCU-CHEK VERONICA PLUS) w/Device kit, USE DAILY AS INSTRUCTED FOR TYPE 2 DIABETES, Disp: 1 kit, Rfl: 0  •  fluticasone (FLONASE) 50 MCG/ACT nasal spray, into each nostril daily., Disp: , Rfl:   •  glucose blood (ACCU-CHEK VERONICA PLUS) test strip, CHECK BLOOD SUGAR ONE TIME DAILY, Disp: 90 each, Rfl: 3  •  Lancets (ACCU-CHEK MULTICLIX) lancets, Check glucose once daily, Disp: 102 each, Rfl: 12  •  pantoprazole (PROTONIX) 40 MG EC tablet, Take 1 tablet by mouth Daily., Disp: 90 tablet, Rfl: 1  •  ACCU-CHEK SOFTCLIX LANCETS lancets, Use as instructed, Disp: 100 each, Rfl: 12  •  ferrous sulfate 325 (65 FE) MG tablet, Take 1 tablet by mouth Daily With Breakfast., Disp: 90 tablet, Rfl: 3  •  vitamin B-12 (CYANOCOBALAMIN) 1000 MCG tablet, Take 1 tablet by mouth Daily., Disp: 90 tablet, Rfl: 3    ALLERGIES:   No Known Allergies    SOCIAL HISTORY:       Social History     Socioeconomic History   • Marital status:      Spouse name: Stevie Dhaliwal   • Number of children: Not on file   • Years of education: Not on file   • Highest education level: Not on file   Occupational History     Employer: [employer]   Tobacco Use   • Smoking status: Never Smoker   • Smokeless tobacco: Never Used   Substance and Sexual Activity   • Alcohol use: Yes     Comment: occasional          FAMILY HISTORY:  Family History   Problem Relation Age of Onset   • Diabetes Mother    · His father was diagnosed of bladder cancer in 2020.       REVIEW OF SYSTEMS:  Review of Systems   Constitutional: Positive for fatigue and unexpected weight change (3 to 4 pound weight loss in the past month, unintentional). Negative for activity change, appetite change, diaphoresis and  "fever.   HENT: Negative for nosebleeds, sore throat and trouble swallowing.    Eyes: Negative for photophobia and visual disturbance.   Respiratory: Negative for cough and shortness of breath.    Cardiovascular: Negative for chest pain, palpitations and leg swelling.   Gastrointestinal: Negative for abdominal pain, blood in stool and nausea.   Endocrine: Negative for cold intolerance and heat intolerance.   Genitourinary: Negative for dysuria and hematuria.   Musculoskeletal: Negative for arthralgias, gait problem and joint swelling.   Skin: Negative for color change and pallor.   Allergic/Immunologic: Negative for environmental allergies and immunocompromised state.   Neurological: Negative for dizziness, numbness and headaches.   Hematological: Positive for adenopathy (Right groin). Does not bruise/bleed easily.   Psychiatric/Behavioral: Negative for agitation and confusion.              Vitals:    09/17/20 1354   BP: 106/69   Pulse: 75   Resp: 14   Temp: 98 °F (36.7 °C)   SpO2: 96%   Weight: 64.5 kg (142 lb 1.6 oz)   Height: 170 cm (66.93\")   PainSc:   2   PainLoc: Comment: lower abd     Current Status 9/17/2020   ECOG score 0      PHYSICAL EXAM:      CONSTITUTIONAL:  Vital signs reviewed.  Well-developed well-nourished  male, no distress, looks comfortable.  EYES:  Conjunctiva and lids unremarkable.  Pupils equal and round.  EARS,NOSE,MOUTH,THROAT:  Ears appear unremarkable. Patient wears mask due to the pandemic coronavirus infection.   RESPIRATORY:  Normal respiratory effort.  Lungs clear to auscultation bilaterally.  CARDIOVASCULAR: Regular rhythm and rate.  Normal S1, S2.  No murmurs rubs or gallops.  No significant lower extremity edema.  GASTROINTESTINAL: Abdomen appears unremarkable.  Nontender.  No hepatomegaly.  No splenomegaly.  Bowel sounds normal.  LYMPHATIC:  No cervical, supraclavicular, axillary lymphadenopathy.  Right groin has fullness, and may be lymphadenopathy, and right above the groin, " there is subcutaneous a small nodule about 7 mm.  MUSCULOSKELETAL:  Unremarkable gait and station.  Unremarkable digits/nails.  No cyanosis or clubbing.  SKIN:  Warm.  No rashes.  PSYCHIATRIC:  Normal judgment and insight.  Normal mood and affect.      RECENT LABS:  Lab Results   Component Value Date    WBC 3.54 09/08/2020    HGB 13.4 09/08/2020    HCT 38.8 09/08/2020    MCV 85.5 09/08/2020     09/08/2020     Lab Results   Component Value Date    NEUTROABS 2.13 09/08/2020     Glucose   Date Value Ref Range Status   09/10/2020 134 (H) 65 - 99 mg/dL Final     BUN   Date Value Ref Range Status   09/10/2020 15 6 - 20 mg/dL Final     Creatinine   Date Value Ref Range Status   09/10/2020 0.90 0.60 - 1.30 mg/dL Final     Comment:     Serial Number: [id number]:  [id number]     Sodium   Date Value Ref Range Status   09/10/2020 141 136 - 145 mmol/L Final     Potassium   Date Value Ref Range Status   09/10/2020 4.2 3.5 - 5.2 mmol/L Final     Chloride   Date Value Ref Range Status   09/10/2020 103 98 - 107 mmol/L Final     CO2   Date Value Ref Range Status   09/10/2020 30.4 (H) 22.0 - 29.0 mmol/L Final     Calcium   Date Value Ref Range Status   09/10/2020 9.5 8.6 - 10.5 mg/dL Final     Total Protein   Date Value Ref Range Status   09/10/2020 7.6 6.0 - 8.5 g/dL Final     Albumin   Date Value Ref Range Status   09/10/2020 4.80 3.50 - 5.20 g/dL Final     ALT (SGPT)   Date Value Ref Range Status   09/10/2020 15 1 - 41 U/L Final     AST (SGOT)   Date Value Ref Range Status   09/10/2020 17 1 - 40 U/L Final     Alkaline Phosphatase   Date Value Ref Range Status   09/10/2020 47 39 - 117 U/L Final     Total Bilirubin   Date Value Ref Range Status   09/10/2020 0.5 0.0 - 1.2 mg/dL Final     eGFR Non  Amer   Date Value Ref Range Status   09/10/2020 87 >60 mL/min/1.73 Final     A/G Ratio   Date Value Ref Range Status   10/15/2019 2.0 g/dL Final     BUN/Creatinine Ratio   Date Value Ref Range Status   09/10/2020 16.7 7.0 -  25.0 Final     Anion Gap   Date Value Ref Range Status   09/10/2020 7.6 5.0 - 15.0 mmol/L Final     Lab Results   Component Value Date    GQLJQOPP36 477 09/10/2020     Lab Results   Component Value Date    FOLATE 17.20 09/10/2020     Lab Results   Component Value Date    IRON 54 (L) 09/10/2020    TIBC 325 09/10/2020    FERRITIN 95.70 09/10/2020   Iron saturation 17%.       IMAGING STUDY:   CT ABDOMEN PELVIS WITH CONTRAST 9/10/2020     Radiation dose reduction techniques were utilized, including automated  exposure control and exposure modulation based on body size.     CLINICAL INFORMATION: Right lower quadrant pain, inguinal  lymphadenopathy, low back pain, burning sensation bladder area.     FINDINGS: Bladder wall thickening or trabeculation, the seminal vesicles  are normal in appearance and the prostate does not appear enlarged.  Prostate measures 3.4 cm AP and 4.8 cm transverse. There are symmetric,  typical inguinal lymph nodes demonstrated. No adenopathy.     The appendix, colon and small bowel have a normal appearance. A typical  amount of fecal material is seen throughout the colon.     The stomach is collapsed, the contour is normal.     The liver, gallbladder, pancreas, spleen and adrenal glands are normal.  No biliary duct dilatation. There is an 8 mm right-sided exophytic renal  cyst, the kidneys are otherwise satisfactory in appearance. No calculus  or obstructive uropathy. Diameter of the aorta is normal.     CONCLUSION: Diffuse bladder wall thickening or trabeculation. Small  exophytic right renal cyst. The remainder is unremarkable.       Assessment/Plan     ASSESSMENT:   1. Vague abdomen pain/heaviness in the pelvis after his urination, with fatigue and palpable right groin nodularity. Upon examination he has some fullness in the right groin but no mass palpable. He has no abdomen pain or guarding, no rebound. No splenomegaly or hepatomegaly.   · Abdomen CT scan examination on 9/10/2020 reported  diffuse thickening of the bladder wall.   · Patient reports no gross hematuria.  I recommended referring patient to urology for further evaluation.    2. Mild anemia. The patient has some chronic fatigue but denies any apparent bleeding.   · Laboratory study on I recommended to have laboratory study on 9/10/2020 reported marginally low iron saturation 17% with reasonable ferritin level, and a low normal vitamin B12 level.  He has good folate level.    · Discussed with the patient today on 9/17/2020, that he has mild iron deficiency and a suboptimal vitamin B12 level.  I recommended oral iron once a day to improve his iron saturation and ferritin.    · I also recommended patient to start oral vitamin B12 to help with erythropoiesis.      3. Mild leukocytopenia with occasional neutropenia. The patient denies recurrent infection. Nevertheless I think this needs to be checked.  · Laboratory studies reported a low normal vitamin B12 level.  Unremarkable CMP.  CT of the abdomen pelvis reported no evidence for fatty liver or splenomegaly.   · I asked patient to start oral vitamin B12 at 1000 mcg daily.       PLAN:   1. Refer to urology service for evaluation of abnormal bladder discovered on CT scan.    2. Start oral iron once a day.  He can buy over-the-counter product with elementary iron 65 mg tablet.    3. Start oral vitamin B12 at 1000 mcg daily.  He can buy over-the-counter product.   4. Future follow-up will be decided, pending urology evaluation.       I personally reviewed the images of his CT scan examination from 9/10/2020.  Initiated his images with the patient today.      DEANDRE PATHAK M.D., Ph.D.    9/17/2020        CC:   MD Eddi Campbell MD

## 2020-09-20 PROBLEM — D50.9 IRON DEFICIENCY ANEMIA: Status: ACTIVE | Noted: 2020-09-08

## 2020-10-08 RX ORDER — BLOOD SUGAR DIAGNOSTIC
STRIP MISCELLANEOUS
OUTPATIENT
Start: 2020-10-08

## 2021-03-26 ENCOUNTER — BULK ORDERING (OUTPATIENT)
Dept: CASE MANAGEMENT | Facility: OTHER | Age: 56
End: 2021-03-26

## 2021-03-26 DIAGNOSIS — Z23 IMMUNIZATION DUE: ICD-10-CM

## 2021-10-06 ENCOUNTER — OFFICE (AMBULATORY)
Dept: URBAN - METROPOLITAN AREA CLINIC 75 | Facility: CLINIC | Age: 56
End: 2021-10-06

## 2021-10-06 VITALS
WEIGHT: 145 LBS | HEART RATE: 60 BPM | SYSTOLIC BLOOD PRESSURE: 122 MMHG | DIASTOLIC BLOOD PRESSURE: 80 MMHG | OXYGEN SATURATION: 99 % | HEIGHT: 68 IN

## 2021-10-06 DIAGNOSIS — R10.13 EPIGASTRIC PAIN: ICD-10-CM

## 2021-10-06 PROCEDURE — 99214 OFFICE O/P EST MOD 30 MIN: CPT | Performed by: NURSE PRACTITIONER

## 2022-01-04 ENCOUNTER — OFFICE (AMBULATORY)
Dept: URBAN - METROPOLITAN AREA CLINIC 75 | Facility: CLINIC | Age: 57
End: 2022-01-04

## 2022-01-04 VITALS
HEART RATE: 98 BPM | WEIGHT: 146 LBS | SYSTOLIC BLOOD PRESSURE: 114 MMHG | DIASTOLIC BLOOD PRESSURE: 68 MMHG | OXYGEN SATURATION: 58 % | HEIGHT: 68 IN

## 2022-01-04 DIAGNOSIS — R10.13 EPIGASTRIC PAIN: ICD-10-CM

## 2022-01-04 PROCEDURE — 99214 OFFICE O/P EST MOD 30 MIN: CPT | Performed by: NURSE PRACTITIONER

## 2022-01-04 RX ORDER — PANTOPRAZOLE 20 MG/1
TABLET, DELAYED RELEASE ORAL
Qty: 90 | Refills: 4 | Status: COMPLETED
End: 2023-02-08

## 2023-02-08 ENCOUNTER — OFFICE (AMBULATORY)
Dept: URBAN - METROPOLITAN AREA CLINIC 76 | Facility: CLINIC | Age: 58
End: 2023-02-08
Payer: COMMERCIAL

## 2023-02-08 VITALS
OXYGEN SATURATION: 99 % | HEART RATE: 65 BPM | WEIGHT: 149 LBS | DIASTOLIC BLOOD PRESSURE: 68 MMHG | SYSTOLIC BLOOD PRESSURE: 122 MMHG | HEIGHT: 68 IN

## 2023-02-08 DIAGNOSIS — R13.10 DYSPHAGIA, UNSPECIFIED: ICD-10-CM

## 2023-02-08 DIAGNOSIS — B96.81 HELICOBACTER PYLORI [H. PYLORI] AS THE CAUSE OF DISEASES CLA: ICD-10-CM

## 2023-02-08 DIAGNOSIS — Z80.0 FAMILY HISTORY OF MALIGNANT NEOPLASM OF DIGESTIVE ORGANS: ICD-10-CM

## 2023-02-08 DIAGNOSIS — K30 FUNCTIONAL DYSPEPSIA: ICD-10-CM

## 2023-02-08 DIAGNOSIS — Z12.11 ENCOUNTER FOR SCREENING FOR MALIGNANT NEOPLASM OF COLON: ICD-10-CM

## 2023-02-08 DIAGNOSIS — R12 HEARTBURN: ICD-10-CM

## 2023-02-08 DIAGNOSIS — K21.9 GASTRO-ESOPHAGEAL REFLUX DISEASE WITHOUT ESOPHAGITIS: ICD-10-CM

## 2023-02-08 DIAGNOSIS — K29.70 GASTRITIS, UNSPECIFIED, WITHOUT BLEEDING: ICD-10-CM

## 2023-02-08 PROBLEM — D12.3 BENIGN NEOPLASM OF TRANSVERSE COLON: Status: ACTIVE | Noted: 2020-01-22

## 2023-02-08 PROBLEM — D12.7 BENIGN NEOPLASM OF RECTOSIGMOID JUNCTION: Status: ACTIVE | Noted: 2020-01-22

## 2023-02-08 PROBLEM — D12.0 BENIGN NEOPLASM OF CECUM: Status: ACTIVE | Noted: 2020-01-22

## 2023-02-08 PROBLEM — K31.89 OTHER DISEASES OF STOMACH AND DUODENUM: Status: ACTIVE | Noted: 2020-01-22

## 2023-02-08 PROCEDURE — 99214 OFFICE O/P EST MOD 30 MIN: CPT | Performed by: NURSE PRACTITIONER

## 2023-02-08 RX ORDER — PANTOPRAZOLE SODIUM 40 MG/1
40 TABLET, DELAYED RELEASE ORAL
Qty: 90 | Refills: 3 | Status: ACTIVE
Start: 2023-02-08

## 2023-03-14 VITALS
SYSTOLIC BLOOD PRESSURE: 131 MMHG | HEART RATE: 53 BPM | HEIGHT: 68 IN | WEIGHT: 144 LBS | DIASTOLIC BLOOD PRESSURE: 69 MMHG | TEMPERATURE: 97.5 F | HEART RATE: 51 BPM | TEMPERATURE: 97.3 F | RESPIRATION RATE: 11 BRPM | DIASTOLIC BLOOD PRESSURE: 64 MMHG | DIASTOLIC BLOOD PRESSURE: 77 MMHG | DIASTOLIC BLOOD PRESSURE: 75 MMHG | RESPIRATION RATE: 17 BRPM | SYSTOLIC BLOOD PRESSURE: 115 MMHG | HEART RATE: 61 BPM | SYSTOLIC BLOOD PRESSURE: 124 MMHG | HEART RATE: 57 BPM | SYSTOLIC BLOOD PRESSURE: 126 MMHG | DIASTOLIC BLOOD PRESSURE: 73 MMHG | RESPIRATION RATE: 8 BRPM | HEART RATE: 65 BPM | SYSTOLIC BLOOD PRESSURE: 136 MMHG | OXYGEN SATURATION: 99 % | SYSTOLIC BLOOD PRESSURE: 99 MMHG | OXYGEN SATURATION: 100 % | HEART RATE: 54 BPM | RESPIRATION RATE: 15 BRPM | SYSTOLIC BLOOD PRESSURE: 110 MMHG | RESPIRATION RATE: 10 BRPM | OXYGEN SATURATION: 96 % | DIASTOLIC BLOOD PRESSURE: 56 MMHG

## 2023-03-22 ENCOUNTER — AMBULATORY SURGICAL CENTER (AMBULATORY)
Dept: URBAN - METROPOLITAN AREA SURGERY 17 | Facility: SURGERY | Age: 58
End: 2023-03-22

## 2023-03-22 ENCOUNTER — OFFICE (AMBULATORY)
Dept: URBAN - METROPOLITAN AREA PATHOLOGY 4 | Facility: PATHOLOGY | Age: 58
End: 2023-03-22

## 2023-03-22 DIAGNOSIS — K31.A15 GASTRIC INTESTINAL METAPLASIA WITHOUT DYSPLASIA, INVOLVING M: ICD-10-CM

## 2023-03-22 DIAGNOSIS — K29.70 GASTRITIS, UNSPECIFIED, WITHOUT BLEEDING: ICD-10-CM

## 2023-03-22 DIAGNOSIS — K29.50 UNSPECIFIED CHRONIC GASTRITIS WITHOUT BLEEDING: ICD-10-CM

## 2023-03-22 DIAGNOSIS — R10.13 EPIGASTRIC PAIN: ICD-10-CM

## 2023-03-22 DIAGNOSIS — K21.9 GASTRO-ESOPHAGEAL REFLUX DISEASE WITHOUT ESOPHAGITIS: ICD-10-CM

## 2023-03-22 DIAGNOSIS — Z86.19 PERSONAL HISTORY OF OTHER INFECTIOUS AND PARASITIC DISEASES: ICD-10-CM

## 2023-03-22 PROBLEM — K31.89 OTHER DISEASES OF STOMACH AND DUODENUM: Status: ACTIVE | Noted: 2023-03-22

## 2023-03-22 LAB
GI HISTOLOGY: A. SELECT: (no result)
GI HISTOLOGY: B. SELECT: (no result)
GI HISTOLOGY: C. SELECT: (no result)
GI HISTOLOGY: D. SELECT: (no result)
GI HISTOLOGY: E. SELECT: (no result)
GI HISTOLOGY: PDF REPORT: (no result)

## 2023-03-22 PROCEDURE — 43239 EGD BIOPSY SINGLE/MULTIPLE: CPT | Performed by: INTERNAL MEDICINE

## 2023-03-22 PROCEDURE — 88305 TISSUE EXAM BY PATHOLOGIST: CPT | Performed by: INTERNAL MEDICINE

## 2023-03-22 PROCEDURE — 88342 IMHCHEM/IMCYTCHM 1ST ANTB: CPT | Performed by: INTERNAL MEDICINE

## 2023-03-22 PROCEDURE — 88341 IMHCHEM/IMCYTCHM EA ADD ANTB: CPT | Performed by: INTERNAL MEDICINE

## 2023-03-22 NOTE — SERVICEHPINOTES
I had the pleasure of seeing Mr. Yates. As you know, he is a very pleasant 57-year-old male patient of Dr. Cervantes with a h/o H. pylori gastritis and colon polyps. He was last seen in our office 1/4/2022 for follow-up of epigastric pain ... was doing well with pantoprazole 40mg QD.... given he was doing well decreased PPI to 20mg QD. He returns today for complaints of epigastric pain and burning. He had resolution of epigastric pain and burning with pantoprazole 40mg QD. He has a h/o H.Pylori gastritis that presented with similar sx of pain and burning. He started that he took pantoprazole 20mg for a week w/o improvement of sx (not long enough???). He is concerned as he has a family history of gastric cancer (maternal side). No nausea or vomiting. Appetite is intact and weight is stable.DATA REVIEWED:brEGD 1/22/2020 as below bx (+) H. pylori gastritisbrCN 1/23/2020 as below bx with SSA x1, hyperplastic polyp ×1 and mucosal polyp ×1

## 2023-06-16 ENCOUNTER — OFFICE (AMBULATORY)
Dept: URBAN - METROPOLITAN AREA CLINIC 76 | Facility: CLINIC | Age: 58
End: 2023-06-16

## 2023-06-16 VITALS
WEIGHT: 144 LBS | HEIGHT: 68 IN | SYSTOLIC BLOOD PRESSURE: 128 MMHG | DIASTOLIC BLOOD PRESSURE: 82 MMHG | OXYGEN SATURATION: 97 % | HEART RATE: 58 BPM

## 2023-06-16 DIAGNOSIS — K31.A0 GASTRIC INTESTINAL METAPLASIA, UNSPECIFIED: ICD-10-CM

## 2023-06-16 DIAGNOSIS — K21.9 GASTRO-ESOPHAGEAL REFLUX DISEASE WITHOUT ESOPHAGITIS: ICD-10-CM

## 2023-06-16 PROCEDURE — 99213 OFFICE O/P EST LOW 20 MIN: CPT | Performed by: INTERNAL MEDICINE

## 2024-08-13 ENCOUNTER — OFFICE (AMBULATORY)
Dept: URBAN - METROPOLITAN AREA CLINIC 76 | Facility: CLINIC | Age: 59
End: 2024-08-13
Payer: COMMERCIAL

## 2024-08-13 VITALS
DIASTOLIC BLOOD PRESSURE: 60 MMHG | SYSTOLIC BLOOD PRESSURE: 114 MMHG | OXYGEN SATURATION: 99 % | HEIGHT: 68 IN | WEIGHT: 148 LBS | HEART RATE: 70 BPM

## 2024-08-13 DIAGNOSIS — K21.9 GASTRO-ESOPHAGEAL REFLUX DISEASE WITHOUT ESOPHAGITIS: ICD-10-CM

## 2024-08-13 PROCEDURE — 99213 OFFICE O/P EST LOW 20 MIN: CPT

## 2024-10-02 VITALS
WEIGHT: 143 LBS | RESPIRATION RATE: 13 BRPM | HEART RATE: 57 BPM | RESPIRATION RATE: 17 BRPM | SYSTOLIC BLOOD PRESSURE: 121 MMHG | HEART RATE: 60 BPM | SYSTOLIC BLOOD PRESSURE: 116 MMHG | RESPIRATION RATE: 17 BRPM | DIASTOLIC BLOOD PRESSURE: 73 MMHG | RESPIRATION RATE: 19 BRPM | DIASTOLIC BLOOD PRESSURE: 75 MMHG | RESPIRATION RATE: 17 BRPM | SYSTOLIC BLOOD PRESSURE: 129 MMHG | HEART RATE: 62 BPM | OXYGEN SATURATION: 98 % | RESPIRATION RATE: 6 BRPM | RESPIRATION RATE: 22 BRPM | DIASTOLIC BLOOD PRESSURE: 75 MMHG | DIASTOLIC BLOOD PRESSURE: 75 MMHG | HEART RATE: 57 BPM | OXYGEN SATURATION: 99 % | OXYGEN SATURATION: 99 % | TEMPERATURE: 96.8 F | TEMPERATURE: 96.8 F | OXYGEN SATURATION: 96 % | RESPIRATION RATE: 19 BRPM | HEART RATE: 60 BPM | SYSTOLIC BLOOD PRESSURE: 135 MMHG | HEIGHT: 68 IN | DIASTOLIC BLOOD PRESSURE: 68 MMHG | SYSTOLIC BLOOD PRESSURE: 129 MMHG | RESPIRATION RATE: 14 BRPM | HEART RATE: 61 BPM | RESPIRATION RATE: 6 BRPM | HEART RATE: 57 BPM | RESPIRATION RATE: 17 BRPM | DIASTOLIC BLOOD PRESSURE: 81 MMHG | RESPIRATION RATE: 14 BRPM | DIASTOLIC BLOOD PRESSURE: 81 MMHG | HEART RATE: 63 BPM | DIASTOLIC BLOOD PRESSURE: 73 MMHG | SYSTOLIC BLOOD PRESSURE: 101 MMHG | RESPIRATION RATE: 6 BRPM | DIASTOLIC BLOOD PRESSURE: 68 MMHG | RESPIRATION RATE: 17 BRPM | RESPIRATION RATE: 19 BRPM | HEART RATE: 61 BPM | WEIGHT: 143 LBS | SYSTOLIC BLOOD PRESSURE: 101 MMHG | RESPIRATION RATE: 19 BRPM | DIASTOLIC BLOOD PRESSURE: 84 MMHG | WEIGHT: 143 LBS | SYSTOLIC BLOOD PRESSURE: 135 MMHG | HEART RATE: 63 BPM | TEMPERATURE: 96.8 F | HEART RATE: 62 BPM | DIASTOLIC BLOOD PRESSURE: 68 MMHG | DIASTOLIC BLOOD PRESSURE: 75 MMHG | SYSTOLIC BLOOD PRESSURE: 125 MMHG | SYSTOLIC BLOOD PRESSURE: 125 MMHG | DIASTOLIC BLOOD PRESSURE: 84 MMHG | HEART RATE: 62 BPM | RESPIRATION RATE: 19 BRPM | OXYGEN SATURATION: 98 % | OXYGEN SATURATION: 100 % | SYSTOLIC BLOOD PRESSURE: 129 MMHG | SYSTOLIC BLOOD PRESSURE: 125 MMHG | SYSTOLIC BLOOD PRESSURE: 135 MMHG | RESPIRATION RATE: 22 BRPM | DIASTOLIC BLOOD PRESSURE: 84 MMHG | OXYGEN SATURATION: 98 % | RESPIRATION RATE: 17 BRPM | SYSTOLIC BLOOD PRESSURE: 135 MMHG | HEIGHT: 68 IN | RESPIRATION RATE: 17 BRPM | HEART RATE: 62 BPM | SYSTOLIC BLOOD PRESSURE: 116 MMHG | HEART RATE: 60 BPM | SYSTOLIC BLOOD PRESSURE: 101 MMHG | DIASTOLIC BLOOD PRESSURE: 68 MMHG | RESPIRATION RATE: 14 BRPM | HEART RATE: 57 BPM | SYSTOLIC BLOOD PRESSURE: 121 MMHG | SYSTOLIC BLOOD PRESSURE: 121 MMHG | HEART RATE: 60 BPM | HEIGHT: 68 IN | SYSTOLIC BLOOD PRESSURE: 101 MMHG | SYSTOLIC BLOOD PRESSURE: 125 MMHG | HEIGHT: 68 IN | OXYGEN SATURATION: 96 % | HEART RATE: 60 BPM | TEMPERATURE: 96.8 F | SYSTOLIC BLOOD PRESSURE: 135 MMHG | HEIGHT: 68 IN | HEART RATE: 62 BPM | TEMPERATURE: 96.8 F | SYSTOLIC BLOOD PRESSURE: 125 MMHG | DIASTOLIC BLOOD PRESSURE: 73 MMHG | OXYGEN SATURATION: 99 % | RESPIRATION RATE: 13 BRPM | HEART RATE: 60 BPM | DIASTOLIC BLOOD PRESSURE: 75 MMHG | OXYGEN SATURATION: 100 % | HEART RATE: 69 BPM | RESPIRATION RATE: 14 BRPM | HEART RATE: 63 BPM | DIASTOLIC BLOOD PRESSURE: 84 MMHG | TEMPERATURE: 98.4 F | OXYGEN SATURATION: 99 % | SYSTOLIC BLOOD PRESSURE: 129 MMHG | SYSTOLIC BLOOD PRESSURE: 116 MMHG | RESPIRATION RATE: 6 BRPM | OXYGEN SATURATION: 96 % | HEART RATE: 61 BPM | HEIGHT: 68 IN | SYSTOLIC BLOOD PRESSURE: 121 MMHG | OXYGEN SATURATION: 98 % | HEART RATE: 69 BPM | DIASTOLIC BLOOD PRESSURE: 84 MMHG | OXYGEN SATURATION: 100 % | RESPIRATION RATE: 22 BRPM | RESPIRATION RATE: 19 BRPM | SYSTOLIC BLOOD PRESSURE: 101 MMHG | DIASTOLIC BLOOD PRESSURE: 81 MMHG | OXYGEN SATURATION: 96 % | SYSTOLIC BLOOD PRESSURE: 116 MMHG | SYSTOLIC BLOOD PRESSURE: 135 MMHG | RESPIRATION RATE: 6 BRPM | OXYGEN SATURATION: 96 % | RESPIRATION RATE: 13 BRPM | HEART RATE: 63 BPM | RESPIRATION RATE: 6 BRPM | RESPIRATION RATE: 22 BRPM | WEIGHT: 143 LBS | HEART RATE: 61 BPM | SYSTOLIC BLOOD PRESSURE: 116 MMHG | HEART RATE: 63 BPM | DIASTOLIC BLOOD PRESSURE: 84 MMHG | TEMPERATURE: 98.4 F | RESPIRATION RATE: 19 BRPM | RESPIRATION RATE: 13 BRPM | DIASTOLIC BLOOD PRESSURE: 73 MMHG | DIASTOLIC BLOOD PRESSURE: 73 MMHG | HEART RATE: 63 BPM | HEART RATE: 61 BPM | TEMPERATURE: 96.8 F | HEART RATE: 62 BPM | HEART RATE: 57 BPM | OXYGEN SATURATION: 99 % | SYSTOLIC BLOOD PRESSURE: 129 MMHG | HEART RATE: 61 BPM | RESPIRATION RATE: 13 BRPM | SYSTOLIC BLOOD PRESSURE: 129 MMHG | OXYGEN SATURATION: 98 % | DIASTOLIC BLOOD PRESSURE: 84 MMHG | SYSTOLIC BLOOD PRESSURE: 121 MMHG | DIASTOLIC BLOOD PRESSURE: 81 MMHG | OXYGEN SATURATION: 99 % | RESPIRATION RATE: 13 BRPM | SYSTOLIC BLOOD PRESSURE: 135 MMHG | WEIGHT: 143 LBS | RESPIRATION RATE: 14 BRPM | RESPIRATION RATE: 14 BRPM | DIASTOLIC BLOOD PRESSURE: 68 MMHG | OXYGEN SATURATION: 100 % | HEART RATE: 69 BPM | DIASTOLIC BLOOD PRESSURE: 75 MMHG | WEIGHT: 143 LBS | SYSTOLIC BLOOD PRESSURE: 116 MMHG | SYSTOLIC BLOOD PRESSURE: 101 MMHG | RESPIRATION RATE: 6 BRPM | DIASTOLIC BLOOD PRESSURE: 73 MMHG | HEART RATE: 60 BPM | HEART RATE: 69 BPM | DIASTOLIC BLOOD PRESSURE: 81 MMHG | TEMPERATURE: 98.4 F | RESPIRATION RATE: 22 BRPM | WEIGHT: 143 LBS | TEMPERATURE: 96.8 F | HEART RATE: 69 BPM | HEART RATE: 61 BPM | TEMPERATURE: 98.4 F | HEIGHT: 68 IN | TEMPERATURE: 98.4 F | TEMPERATURE: 98.4 F | OXYGEN SATURATION: 98 % | HEART RATE: 57 BPM | DIASTOLIC BLOOD PRESSURE: 68 MMHG | HEART RATE: 57 BPM | OXYGEN SATURATION: 99 % | RESPIRATION RATE: 22 BRPM | DIASTOLIC BLOOD PRESSURE: 75 MMHG | SYSTOLIC BLOOD PRESSURE: 121 MMHG | SYSTOLIC BLOOD PRESSURE: 101 MMHG | RESPIRATION RATE: 13 BRPM | SYSTOLIC BLOOD PRESSURE: 129 MMHG | OXYGEN SATURATION: 100 % | HEART RATE: 69 BPM | DIASTOLIC BLOOD PRESSURE: 81 MMHG | OXYGEN SATURATION: 96 % | SYSTOLIC BLOOD PRESSURE: 125 MMHG | OXYGEN SATURATION: 96 % | RESPIRATION RATE: 22 BRPM | HEART RATE: 63 BPM | TEMPERATURE: 98.4 F | HEART RATE: 62 BPM | DIASTOLIC BLOOD PRESSURE: 68 MMHG | HEART RATE: 69 BPM | OXYGEN SATURATION: 100 % | DIASTOLIC BLOOD PRESSURE: 73 MMHG | RESPIRATION RATE: 14 BRPM | SYSTOLIC BLOOD PRESSURE: 121 MMHG | SYSTOLIC BLOOD PRESSURE: 116 MMHG | DIASTOLIC BLOOD PRESSURE: 81 MMHG | OXYGEN SATURATION: 100 % | SYSTOLIC BLOOD PRESSURE: 125 MMHG | OXYGEN SATURATION: 98 %

## 2024-10-08 PROBLEM — K31.89 INTESTINAL METAPLASIA OF GASTRIC MUCOSA: Status: ACTIVE | Noted: 2024-10-09

## 2024-10-09 ENCOUNTER — OFFICE (AMBULATORY)
Age: 59
End: 2024-10-09
Payer: COMMERCIAL

## 2024-10-09 ENCOUNTER — AMBULATORY SURGICAL CENTER (AMBULATORY)
Age: 59
End: 2024-10-09

## 2024-10-09 ENCOUNTER — OFFICE (AMBULATORY)
Dept: URBAN - METROPOLITAN AREA PATHOLOGY 4 | Facility: PATHOLOGY | Age: 59
End: 2024-10-09
Payer: COMMERCIAL

## 2024-10-09 ENCOUNTER — AMBULATORY SURGICAL CENTER (AMBULATORY)
Dept: URBAN - METROPOLITAN AREA SURGERY 17 | Facility: SURGERY | Age: 59
End: 2024-10-09

## 2024-10-09 DIAGNOSIS — K31.89 OTHER DISEASES OF STOMACH AND DUODENUM: ICD-10-CM

## 2024-10-09 DIAGNOSIS — K21.9 GASTRO-ESOPHAGEAL REFLUX DISEASE WITHOUT ESOPHAGITIS: ICD-10-CM

## 2024-10-09 DIAGNOSIS — K31.A11 GASTRIC INTESTINAL METAPLASIA WITHOUT DYSPLASIA, INVOLVING T: ICD-10-CM

## 2024-10-09 DIAGNOSIS — B96.81 HELICOBACTER PYLORI [H. PYLORI] AS THE CAUSE OF DISEASES CLA: ICD-10-CM

## 2024-10-09 LAB
GI HISTOLOGY: PDF REPORT: (no result)
Lab: (no result)

## 2024-10-09 PROCEDURE — 43239 EGD BIOPSY SINGLE/MULTIPLE: CPT | Performed by: INTERNAL MEDICINE

## 2024-10-09 PROCEDURE — 88305 TISSUE EXAM BY PATHOLOGIST: CPT | Performed by: PATHOLOGY

## 2024-10-09 PROCEDURE — 88342 IMHCHEM/IMCYTCHM 1ST ANTB: CPT | Performed by: PATHOLOGY

## 2024-10-09 NOTE — SERVICEHPINOTES
Patient is a 58 year old male with history of GERD, diabetes, and HLD here for follow up. He is here today for an EGD evaluation.He is currently controlling his pantoprazole 40 mg as needed and it not taking it daily at this time. He reports 2 months ago he started noticing abdominal pain and discomfort in the epigastric region. His PPI has not helped much with is. He recently traveled and is wondering if this caused it. He thinks it is also diet related. He reports his symptoms have improved over the last few weeks. He denies nausea, vomiting, dysphagia, unintentional weight loss, or melena. He underwent an EGD on 3/22/2023 which revealed gastritis with intestinal metaplasia. He has a family history of gastric cancer in maternal uncle.He has a BM daily. His BM's are soft and formed. He has occasional constipation. He denies blood in his stool. He denies family history of colon cancer. His last CN was in 2020 which revealed multiple mucosal polyps and one adenomatous polyp.

## 2025-02-05 ENCOUNTER — TRANSCRIBE ORDERS (OUTPATIENT)
Dept: ADMINISTRATIVE | Facility: HOSPITAL | Age: 60
End: 2025-02-05
Payer: COMMERCIAL

## 2025-02-05 DIAGNOSIS — R07.9 CHEST PAIN, UNSPECIFIED TYPE: Primary | ICD-10-CM
